# Patient Record
Sex: FEMALE | Race: WHITE | NOT HISPANIC OR LATINO | URBAN - METROPOLITAN AREA
[De-identification: names, ages, dates, MRNs, and addresses within clinical notes are randomized per-mention and may not be internally consistent; named-entity substitution may affect disease eponyms.]

---

## 2019-08-19 ENCOUNTER — APPOINTMENT (RX ONLY)
Dept: URBAN - METROPOLITAN AREA CLINIC 23 | Facility: CLINIC | Age: 59
Setting detail: DERMATOLOGY
End: 2019-08-19

## 2019-08-19 DIAGNOSIS — L72.0 EPIDERMAL CYST: ICD-10-CM

## 2019-08-19 DIAGNOSIS — L57.8 OTHER SKIN CHANGES DUE TO CHRONIC EXPOSURE TO NONIONIZING RADIATION: ICD-10-CM

## 2019-08-19 DIAGNOSIS — L72.8 OTHER FOLLICULAR CYSTS OF THE SKIN AND SUBCUTANEOUS TISSUE: ICD-10-CM

## 2019-08-19 DIAGNOSIS — L40.59 OTHER PSORIATIC ARTHROPATHY: ICD-10-CM | Status: WELL CONTROLLED

## 2019-08-19 DIAGNOSIS — L82.1 OTHER SEBORRHEIC KERATOSIS: ICD-10-CM

## 2019-08-19 DIAGNOSIS — L81.4 OTHER MELANIN HYPERPIGMENTATION: ICD-10-CM

## 2019-08-19 PROBLEM — F32.9 MAJOR DEPRESSIVE DISORDER, SINGLE EPISODE, UNSPECIFIED: Status: ACTIVE | Noted: 2019-08-19

## 2019-08-19 PROBLEM — M12.9 ARTHROPATHY, UNSPECIFIED: Status: ACTIVE | Noted: 2019-08-19

## 2019-08-19 PROBLEM — I10 ESSENTIAL (PRIMARY) HYPERTENSION: Status: ACTIVE | Noted: 2019-08-19

## 2019-08-19 PROBLEM — F41.9 ANXIETY DISORDER, UNSPECIFIED: Status: ACTIVE | Noted: 2019-08-19

## 2019-08-19 PROBLEM — H91.90 UNSPECIFIED HEARING LOSS, UNSPECIFIED EAR: Status: ACTIVE | Noted: 2019-08-19

## 2019-08-19 PROCEDURE — 99203 OFFICE O/P NEW LOW 30 MIN: CPT

## 2019-08-19 PROCEDURE — ? COUNSELING

## 2019-08-19 PROCEDURE — ? OTHER

## 2019-08-19 PROCEDURE — ? RECOMMENDATIONS

## 2019-08-19 PROCEDURE — ? TREATMENT REGIMEN

## 2019-08-19 ASSESSMENT — LOCATION DETAILED DESCRIPTION DERM
LOCATION DETAILED: RIGHT ANTERIOR PROXIMAL THIGH
LOCATION DETAILED: LEFT AXILLARY VAULT
LOCATION DETAILED: UPPER STERNUM
LOCATION DETAILED: RIGHT PROXIMAL DORSAL FOREARM
LOCATION DETAILED: LEFT MID-UPPER BACK
LOCATION DETAILED: LEFT SUPERIOR MEDIAL UPPER BACK
LOCATION DETAILED: RIGHT CENTRAL BUCCAL CHEEK
LOCATION DETAILED: RIGHT POSTERIOR ANKLE
LOCATION DETAILED: LEFT ANTERIOR DISTAL UPPER ARM
LOCATION DETAILED: LEFT CHIN
LOCATION DETAILED: LEFT INFERIOR LATERAL BUCCAL CHEEK

## 2019-08-19 ASSESSMENT — LOCATION ZONE DERM
LOCATION ZONE: AXILLAE
LOCATION ZONE: LEG
LOCATION ZONE: TRUNK
LOCATION ZONE: ARM
LOCATION ZONE: FACE

## 2019-08-19 ASSESSMENT — LOCATION SIMPLE DESCRIPTION DERM
LOCATION SIMPLE: LEFT UPPER BACK
LOCATION SIMPLE: RIGHT FOREARM
LOCATION SIMPLE: RIGHT THIGH
LOCATION SIMPLE: LEFT CHEEK
LOCATION SIMPLE: CHIN
LOCATION SIMPLE: LEFT UPPER ARM
LOCATION SIMPLE: LEFT AXILLARY VAULT
LOCATION SIMPLE: RIGHT ANKLE
LOCATION SIMPLE: CHEST
LOCATION SIMPLE: RIGHT CHEEK

## 2019-08-19 NOTE — PROCEDURE: TREATMENT REGIMEN
Detail Level: Zone
Plan: Consider tretinoin cream
Samples Given: CeraVe foaming face cleanser and CeraVe AM lotion

## 2019-08-19 NOTE — PROCEDURE: RECOMMENDATIONS
Detail Level: Zone
Recommendation Preamble: The following recommendations were made during the visit:  consultation with kimberly Saab

## 2019-08-19 NOTE — PROCEDURE: OTHER
Other (Free Text): Rec [facility] Sx if pt desires removal
Note Text (......Xxx Chief Complaint.): This diagnosis correlates with the
Detail Level: Zone

## 2020-09-28 ENCOUNTER — APPOINTMENT (RX ONLY)
Dept: URBAN - METROPOLITAN AREA CLINIC 23 | Facility: CLINIC | Age: 60
Setting detail: DERMATOLOGY
End: 2020-09-28

## 2020-09-28 NOTE — HPI: FULL BODY SKIN EXAMINATION
What Type Of Note Output Would You Prefer (Optional)?: Standard Output
What Is The Reason For Today's Visit?: Full Body Skin Examination
What Is The Reason For Today's Visit? (Being Monitored For X): concerning skin lesions on an annual basis
How Severe Are Your Spot(S)?: mild
Additional History: Pt gives verbal consent to biopsy.Shellie

## 2020-11-23 ENCOUNTER — HOSPITAL ENCOUNTER (OUTPATIENT)
Dept: LAB | Age: 60
Discharge: HOME | End: 2020-11-23

## 2020-11-23 PROCEDURE — 88305 TISSUE EXAM BY PATHOLOGIST: CPT

## 2021-04-01 PROBLEM — M54.2 CERVICALGIA: Status: ACTIVE | Noted: 2019-01-07

## 2021-04-01 PROBLEM — M47.812 CERVICAL SPONDYLOSIS WITHOUT MYELOPATHY: Status: ACTIVE | Noted: 2019-01-07

## 2021-04-01 PROBLEM — G89.29 CHRONIC BILATERAL LOW BACK PAIN WITHOUT SCIATICA: Status: ACTIVE | Noted: 2019-05-28

## 2021-04-01 PROBLEM — L40.50 PSORIATIC ARTHROPATHY (HCC): Status: ACTIVE | Noted: 2021-04-01

## 2021-04-01 PROBLEM — M54.50 CHRONIC BILATERAL LOW BACK PAIN WITHOUT SCIATICA: Status: ACTIVE | Noted: 2019-05-28

## 2021-04-01 PROBLEM — Z72.821 POOR SLEEP HYGIENE: Status: ACTIVE | Noted: 2021-04-01

## 2021-04-01 PROBLEM — R06.83 SNORING: Status: ACTIVE | Noted: 2021-04-01

## 2021-04-09 PROBLEM — J41.0 SIMPLE CHRONIC BRONCHITIS (HCC): Chronic | Status: ACTIVE | Noted: 2021-04-09

## 2021-04-09 PROBLEM — D84.821 IMMUNOSUPPRESSION DUE TO DRUG THERAPY (HCC): Status: ACTIVE | Noted: 2021-04-09

## 2021-04-09 PROBLEM — F17.200 NICOTINE DEPENDENCE: Status: ACTIVE | Noted: 2021-04-09

## 2021-04-09 PROBLEM — R09.89 HYPERINFLATION OF LUNGS: Chronic | Status: ACTIVE | Noted: 2021-04-09

## 2021-04-09 PROBLEM — Z79.899 IMMUNOSUPPRESSION DUE TO DRUG THERAPY (HCC): Status: ACTIVE | Noted: 2021-04-09

## 2021-04-19 ENCOUNTER — HOSPITAL ENCOUNTER (OUTPATIENT)
Dept: SLEEP MEDICINE | Age: 61
Discharge: HOME | End: 2021-04-19
Payer: COMMERCIAL

## 2021-04-19 PROCEDURE — 95806 SLEEP STUDY UNATT&RESP EFFT: CPT

## 2021-04-22 ENCOUNTER — HOSPITAL ENCOUNTER (OUTPATIENT)
Dept: CT IMAGING | Age: 61
Discharge: HOME | End: 2021-04-22
Attending: NURSE PRACTITIONER
Payer: COMMERCIAL

## 2021-04-22 VITALS — BODY MASS INDEX: 18.48 KG/M2 | WEIGHT: 115 LBS | HEIGHT: 66 IN

## 2021-04-22 DIAGNOSIS — F17.218 CIGARETTE NICOTINE DEPENDENCE WITH OTHER NICOTINE-INDUCED DISORDER: Chronic | ICD-10-CM

## 2021-04-22 PROCEDURE — 71271 CT THORAX LUNG CANCER SCR C-: CPT

## 2021-04-23 NOTE — PROGRESS NOTES
CT is personally reviewed, demonstrates 3 mm RUL nodule and emphysematous changes. I have contacted her with findings. She will have complete pulmonary function test as scheduled and keep follow-up appointment as planned. We will obtain alpha-1 antitrypsin level at her next visit and have discussed having annual screening CT in 1 year.

## 2021-06-18 PROBLEM — J41.0 SIMPLE CHRONIC BRONCHITIS (HCC): Chronic | Status: RESOLVED | Noted: 2021-04-09 | Resolved: 2021-06-18

## 2021-06-18 PROBLEM — J43.2 CENTRILOBULAR EMPHYSEMA (HCC): Status: ACTIVE | Noted: 2021-06-18

## 2021-07-07 PROBLEM — J44.9 OSA AND COPD OVERLAP SYNDROME (HCC): Status: ACTIVE | Noted: 2021-04-01

## 2021-07-07 PROBLEM — G47.33 OSA AND COPD OVERLAP SYNDROME (HCC): Status: ACTIVE | Noted: 2021-04-01

## 2021-07-07 PROBLEM — G47.10 HYPERSOMNIA: Status: ACTIVE | Noted: 2021-07-07

## 2021-10-26 ENCOUNTER — HOSPITAL ENCOUNTER (OUTPATIENT)
Dept: INTERVENTIONAL RADIOLOGY/VASCULAR | Age: 61
Discharge: HOME | End: 2021-10-26
Attending: PSYCHIATRY & NEUROLOGY

## 2021-10-27 PROBLEM — Z87.891 HISTORY OF CIGARETTE SMOKING: Status: ACTIVE | Noted: 2021-10-27

## 2021-10-27 PROBLEM — F17.200 NICOTINE DEPENDENCE: Status: RESOLVED | Noted: 2021-04-09 | Resolved: 2021-10-27

## 2021-10-27 PROBLEM — J44.9 COPD (CHRONIC OBSTRUCTIVE PULMONARY DISEASE) (HCC): Status: ACTIVE | Noted: 2021-06-18

## 2021-12-03 ENCOUNTER — HOSPITAL ENCOUNTER (OUTPATIENT)
Dept: INTERVENTIONAL RADIOLOGY/VASCULAR | Age: 61
Discharge: HOME | End: 2021-12-03
Attending: PSYCHIATRY & NEUROLOGY
Payer: COMMERCIAL

## 2021-12-03 ENCOUNTER — HOSPITAL ENCOUNTER (OUTPATIENT)
Dept: CT IMAGING | Age: 61
Discharge: HOME | End: 2021-12-03
Attending: PSYCHIATRY & NEUROLOGY
Payer: COMMERCIAL

## 2021-12-03 VITALS
HEART RATE: 59 BPM | SYSTOLIC BLOOD PRESSURE: 113 MMHG | WEIGHT: 105.5 LBS | BODY MASS INDEX: 17.58 KG/M2 | OXYGEN SATURATION: 92 % | HEIGHT: 65 IN | RESPIRATION RATE: 16 BRPM | DIASTOLIC BLOOD PRESSURE: 57 MMHG | TEMPERATURE: 97.7 F

## 2021-12-03 DIAGNOSIS — Z82.49 FAMILY HISTORY OF CEREBRAL ANEURYSM: ICD-10-CM

## 2021-12-03 DIAGNOSIS — R90.89 ABNORMAL FINDING ON MRI OF BRAIN: ICD-10-CM

## 2021-12-03 DIAGNOSIS — G43.009 MIGRAINE WITHOUT AURA AND WITHOUT STATUS MIGRAINOSUS, NOT INTRACTABLE: ICD-10-CM

## 2021-12-03 DIAGNOSIS — H53.8 BLURRY VISION, RIGHT EYE: ICD-10-CM

## 2021-12-03 LAB
APPEARANCE FLD: CLEAR
COLOR FLD: COLORLESS
CREAT BLD-MCNC: 0.64 MG/DL (ref 0.8–1.5)
GLUCOSE CSF-MCNC: 58 MG/DL (ref 40–70)
NUC CELL # FLD: 2 /CU MM
PROT CSF-MCNC: 59 MG/DL (ref 15–45)
RBC # FLD: 0 /CU MM
SPECIMEN SOURCE FLD: NORMAL
TUBE # CSF: 1
TUBE # CSF: 1

## 2021-12-03 PROCEDURE — 74011000250 HC RX REV CODE- 250: Performed by: PHYSICIAN ASSISTANT

## 2021-12-03 PROCEDURE — 82565 ASSAY OF CREATININE: CPT

## 2021-12-03 PROCEDURE — 74011000258 HC RX REV CODE- 258: Performed by: PSYCHIATRY & NEUROLOGY

## 2021-12-03 PROCEDURE — 74011000636 HC RX REV CODE- 636: Performed by: PSYCHIATRY & NEUROLOGY

## 2021-12-03 PROCEDURE — 82040 ASSAY OF SERUM ALBUMIN: CPT

## 2021-12-03 PROCEDURE — 77030003666 HC NDL SPINAL BD -A

## 2021-12-03 PROCEDURE — 82164 ANGIOTENSIN I ENZYME TEST: CPT

## 2021-12-03 PROCEDURE — 70498 CT ANGIOGRAPHY NECK: CPT

## 2021-12-03 PROCEDURE — 73060999999 HC MISC LAB CHARGE

## 2021-12-03 PROCEDURE — 86592 SYPHILIS TEST NON-TREP QUAL: CPT

## 2021-12-03 PROCEDURE — 86617 LYME DISEASE ANTIBODY: CPT

## 2021-12-03 PROCEDURE — 82945 GLUCOSE OTHER FLUID: CPT

## 2021-12-03 PROCEDURE — 77030014143 HC TY PUNC LUMBR BD -A

## 2021-12-03 PROCEDURE — 84157 ASSAY OF PROTEIN OTHER: CPT

## 2021-12-03 PROCEDURE — 89050 BODY FLUID CELL COUNT: CPT

## 2021-12-03 PROCEDURE — 62270 DX LMBR SPI PNXR: CPT

## 2021-12-03 PROCEDURE — 83520 IMMUNOASSAY QUANT NOS NONAB: CPT

## 2021-12-03 PROCEDURE — 87205 SMEAR GRAM STAIN: CPT

## 2021-12-03 RX ORDER — SODIUM CHLORIDE 0.9 % (FLUSH) 0.9 %
10 SYRINGE (ML) INJECTION
Status: COMPLETED | OUTPATIENT
Start: 2021-12-03 | End: 2021-12-03

## 2021-12-03 RX ORDER — LIDOCAINE HYDROCHLORIDE 20 MG/ML
1-10 INJECTION, SOLUTION INFILTRATION; PERINEURAL
Status: DISCONTINUED | OUTPATIENT
Start: 2021-12-03 | End: 2021-12-07 | Stop reason: HOSPADM

## 2021-12-03 RX ADMIN — Medication 10 ML: at 08:44

## 2021-12-03 RX ADMIN — LIDOCAINE HYDROCHLORIDE 100 MG: 20 INJECTION, SOLUTION INFILTRATION; PERINEURAL at 09:30

## 2021-12-03 RX ADMIN — IOPAMIDOL 50 ML: 755 INJECTION, SOLUTION INTRAVENOUS at 08:44

## 2021-12-03 RX ADMIN — SODIUM CHLORIDE 100 ML: 900 INJECTION, SOLUTION INTRAVENOUS at 08:44

## 2021-12-03 NOTE — DISCHARGE INSTRUCTIONS
[address]  Department of Interventional Radiology  [facility]  [phone] Office  [phone] Fax  POST LUMBAR PUNCTURE/MYELOGRAM/INTRATHECAL CHEMOTHERAPY DISCHARGE INSTRUCTIONS  General Information:  Lumbar Puncture: A LP is done to help diagnose several disorders, like pseudo tumor, migraines, meningitis, and multiple sclerosis. It involves a puncture (usually in the lower spine) into the sac that protects the spinal column. A sample of the fluid in that space is removed and tested in the lab. Myelogram:   A Myelogram involves a lumbar puncture, and instead of removing fluid, contrast will be injected into the sac surrounding the spinal column. It is done to visualize the spinal column, nerve roots, spinal canal, vertebral discs and disc space. It is usually done to diagnose back pain with unknown cause or in preparation for surgery. After the injection, a CT scan will be done, usually within two hours of the injection. Intrathecal Chemotherapy:   Chemotherapy can be given in many forms. Intrathecal chemo involves a lumbar puncture, and instead of removing fluid, the chemo will be injected into the space. After any of these procedures, you will be asked to lie flat on your back for 4-6 hours to prevent complications. You should also rest for 24 hours after you go home, and force fluids. If you have a headache, you should take Tylenol or acetaminophen. Call If:   You should call your Physician and/or the Radiology Nurse if you develop a headache that is not relieved by Tylenol, and worsens when you stand and eases when you lie down, you need to call. You may have developed what is referred to as a spinal headache. Our physicians will probably advise you to be on strict bed rest for 24 hours, to drink lots of fluids and caffeine. If this does not help the head pain, call again the next day.  You should call if you have bleeding other than a small spot on your bandage. You should call if you have any numbness, tingling, weakness, fever, chills, urinary retention, severe itching, rash, welts, swelling, or confusion. Follow-up Instructions: See the doctor who ordered your procedure as he/she has instructed. If you had a Lumbar Puncture or Myelogram, your results should be available to your ordering doctor in 3-5 business days. You can remove your dressing in 24 hours and shower regularly. Do not bathe or swim for 72 hours. To Reach Us: If you have any questions about your procedure, please call the Interventional Radiology department at [phone]. After business hours (5pm) and weekends, call the answering service at [phone] and ask for the Radiologist on call to be paged. Si tiene Preguntas acerca del procedimiento, por favor llame al departamento de Radiología Intervencional al [phone]. Después de horas de oficina (5 pm) y los fines de [place], llamar al [facility] [place] al [phone] y pregunte por el Radiologo de [facility]. Interventional Radiology General Nurse Discharge    After general anesthesia or intravenous sedation, for 24 hours or while taking prescription Narcotics:  · Limit your activities  · Do not drive and operate hazardous machinery  · Do not make important personal or business decisions  · Do  not drink alcoholic beverages  · If you have not urinated within 8 hours after discharge, please contact your surgeon on call. * Please give a list of your current medications to your Primary Care Provider. * Please update this list whenever your medications are discontinued, doses are     changed, or new medications (including over-the-counter products) are added. * Please carry medication information at all times in case of emergency situations.     These are general instructions for a healthy lifestyle:    No smoking/ No tobacco products/ Avoid exposure to second hand smoke  Surgeon Sara Lamas Warning:  Quitting smoking now greatly reduces serious risk to your health. Obesity, smoking, and sedentary lifestyle greatly increases your risk for illness  A healthy diet, regular physical exercise & weight monitoring are important for maintaining a healthy lifestyle    You may be retaining fluid if you have a history of heart failure or if you experience any of the following symptoms:  Weight gain of 3 pounds or more overnight or 5 pounds in a week, increased swelling in our hands or feet or shortness of breath while lying flat in bed. Please call your doctor as soon as you notice any of these symptoms; do not wait until your next office visit. Recognize signs and symptoms of STROKE:  F-face looks uneven    A-arms unable to move or move unevenly    S-speech slurred or non-existent    T-time-call 911 as soon as signs and symptoms begin-DO NOT go       Back to bed or wait to see if you get better-TIME IS BRAIN.     Patient Signature:  Date: 12/3/2021  Discharging Nurse: Alena Barrios RN

## 2021-12-03 NOTE — PROGRESS NOTES
TRANSFER - OUT REPORT:    Verbal report given to YODIT Harvey(name) on Maria Teresa Paul  being transferred to [facility](unit) for routine post - op       Report consisted of patients Situation, Background, Assessment and   Recommendations(SBAR). Information from the following report(s) SBAR and Procedure Summary was reviewed with the receiving nurse. Opportunity for questions and clarification was provided. Pt tolerated procedure well.      Visit Vitals  BP (!) 118/58   Pulse (!) 55   Temp 97.7 °F (36.5 °C)   Resp 18   Ht 5' 5\" (1.651 m)   Wt 47.9 kg (105 lb 8 oz)   SpO2 96%   Breastfeeding No   BMI 17.56 kg/m²     Past Medical History:   Diagnosis Date    Ankylosing spondylitis (HCC)     Bronchitis     Spinal stenosis      Peripheral IV 12/03/21 Distal; Right Antecubital (Active)

## 2021-12-06 LAB
ANGIO CONVERTING ENZ, CSF: 2.8 U/L (ref 0–3.1)
REAGIN AB CSF QL: NON REACTIVE

## 2021-12-07 LAB
ALB CSF/SERPL: 8 {RATIO} (ref 0–8)
ALBUMIN CSF-MCNC: 30 MG/DL (ref 8–37)
ALBUMIN SERPL-MCNC: 3.9 G/DL (ref 3.8–4.9)
IGG CSF-MCNC: 4.3 MG/DL (ref 0–6.7)
IGG SERPL-MCNC: 1237 MG/DL (ref 586–1602)
IGG SYNTH RATE SER+CSF CALC-MRATE: NORMAL MG/DAY
IGG/ALB CLEAR SER+CSF-RTO: 0.5 (ref 0–0.7)
IGG/ALB CSF: 0.14 {RATIO} (ref 0–0.25)
OLIGOCLONAL BANDS.IT SER+CSF QL: NORMAL

## 2021-12-08 LAB
BACTERIA SPEC CULT: NORMAL
GRAM STN SPEC: NORMAL
GRAM STN SPEC: NORMAL
SERVICE CMNT-IMP: NORMAL

## 2021-12-08 NOTE — PROGRESS NOTES
Have you had echocardiogram done in the past?  I cannot find this in your file. If not yet done in the past, I will arrange to complete stroke work-up.

## 2021-12-14 LAB
B BURGDOR IGG PATRN CSF IB-IMP: NEGATIVE
B BURGDOR IGM PATRN CSF IB-IMP: NEGATIVE
B BURGDOR18KD IGG CSF QL IB: NORMAL
B BURGDOR23KD IGG CSF QL IB: NORMAL
B BURGDOR23KD IGM CSF QL IB: NORMAL
B BURGDOR28KD IGG CSF QL IB: NORMAL
B BURGDOR30KD IGG CSF QL IB: NORMAL
B BURGDOR39KD IGG CSF QL IB: NORMAL
B BURGDOR39KD IGM CSF QL IB: NORMAL
B BURGDOR41KD IGG CSF QL IB: NORMAL
B BURGDOR41KD IGM CSF QL IB: NORMAL
B BURGDOR45KD IGG CSF QL IB: NORMAL
B BURGDOR58KD IGG CSF QL IB: NORMAL
B BURGDOR66KD IGG CSF QL IB: NORMAL
B BURGDOR93KD IGG CSF QL IB: NORMAL

## 2021-12-30 LAB
Lab: NORMAL
REFERENCE LAB,REFLB: NORMAL
TEST DESCRIPTION:,ATST: NORMAL

## 2022-01-25 ENCOUNTER — HOSPITAL ENCOUNTER (OUTPATIENT)
Dept: GENERAL RADIOLOGY | Age: 62
Discharge: HOME | End: 2022-01-25
Payer: COMMERCIAL

## 2022-01-25 DIAGNOSIS — J44.1 CHRONIC OBSTRUCTIVE PULMONARY DISEASE WITH ACUTE EXACERBATION (HCC): ICD-10-CM

## 2022-01-25 DIAGNOSIS — Z87.891 HISTORY OF CIGARETTE SMOKING: ICD-10-CM

## 2022-01-25 DIAGNOSIS — R09.89 HYPERINFLATION OF LUNGS: ICD-10-CM

## 2022-01-25 PROCEDURE — 71046 X-RAY EXAM CHEST 2 VIEWS: CPT

## 2022-03-18 PROBLEM — J44.9 COPD (CHRONIC OBSTRUCTIVE PULMONARY DISEASE) (HCC): Status: ACTIVE | Noted: 2021-06-18

## 2022-03-19 PROBLEM — Z72.821 POOR SLEEP HYGIENE: Status: ACTIVE | Noted: 2021-04-01

## 2022-03-19 PROBLEM — M47.812 CERVICAL SPONDYLOSIS WITHOUT MYELOPATHY: Status: ACTIVE | Noted: 2019-01-07

## 2022-03-19 PROBLEM — M54.2 CERVICALGIA: Status: ACTIVE | Noted: 2019-01-07

## 2022-03-19 PROBLEM — D84.821 IMMUNOSUPPRESSION DUE TO DRUG THERAPY (HCC): Status: ACTIVE | Noted: 2021-04-09

## 2022-03-19 PROBLEM — R09.89 HYPERINFLATION OF LUNGS: Status: ACTIVE | Noted: 2021-04-09

## 2022-03-19 PROBLEM — Z79.899 IMMUNOSUPPRESSION DUE TO DRUG THERAPY (HCC): Status: ACTIVE | Noted: 2021-04-09

## 2022-03-19 PROBLEM — Z87.891 HISTORY OF CIGARETTE SMOKING: Status: ACTIVE | Noted: 2021-10-27

## 2022-03-20 PROBLEM — J44.9 OSA AND COPD OVERLAP SYNDROME (HCC): Status: ACTIVE | Noted: 2021-04-01

## 2022-03-20 PROBLEM — G89.29 CHRONIC BILATERAL LOW BACK PAIN WITHOUT SCIATICA: Status: ACTIVE | Noted: 2019-05-28

## 2022-03-20 PROBLEM — L40.50 PSORIATIC ARTHROPATHY (HCC): Status: ACTIVE | Noted: 2021-04-01

## 2022-03-20 PROBLEM — G47.33 OSA AND COPD OVERLAP SYNDROME (HCC): Status: ACTIVE | Noted: 2021-04-01

## 2022-03-20 PROBLEM — M54.50 CHRONIC BILATERAL LOW BACK PAIN WITHOUT SCIATICA: Status: ACTIVE | Noted: 2019-05-28

## 2022-03-20 PROBLEM — G47.10 HYPERSOMNIA: Status: ACTIVE | Noted: 2021-07-07

## 2022-06-29 DIAGNOSIS — Z87.891 HISTORY OF NICOTINE DEPENDENCE: ICD-10-CM

## 2022-06-29 DIAGNOSIS — Z12.9 SCREENING FOR CANCER: Primary | ICD-10-CM

## 2022-07-11 ENCOUNTER — HOSPITAL ENCOUNTER (OUTPATIENT)
Dept: CT IMAGING | Age: 62
Discharge: HOME | End: 2022-07-14
Payer: COMMERCIAL

## 2022-07-11 DIAGNOSIS — Z87.891 HISTORY OF NICOTINE DEPENDENCE: ICD-10-CM

## 2022-07-11 DIAGNOSIS — Z12.9 SCREENING FOR CANCER: ICD-10-CM

## 2022-07-11 PROCEDURE — 71271 CT THORAX LUNG CANCER SCR C-: CPT

## 2022-07-14 ENCOUNTER — OFFICE VISIT (OUTPATIENT)
Dept: PULMONOLOGY | Age: 62
End: 2022-07-14
Payer: COMMERCIAL

## 2022-07-14 VITALS
DIASTOLIC BLOOD PRESSURE: 80 MMHG | SYSTOLIC BLOOD PRESSURE: 110 MMHG | TEMPERATURE: 98 F | WEIGHT: 111 LBS | HEART RATE: 97 BPM | BODY MASS INDEX: 18.49 KG/M2 | RESPIRATION RATE: 20 BRPM | OXYGEN SATURATION: 97 % | HEIGHT: 65 IN

## 2022-07-14 DIAGNOSIS — Z87.891 PERSONAL HISTORY OF TOBACCO USE, PRESENTING HAZARDS TO HEALTH: ICD-10-CM

## 2022-07-14 DIAGNOSIS — J44.9 CHRONIC OBSTRUCTIVE PULMONARY DISEASE, UNSPECIFIED COPD TYPE (HCC): Primary | ICD-10-CM

## 2022-07-14 DIAGNOSIS — R91.1 LUNG NODULE: ICD-10-CM

## 2022-07-14 PROCEDURE — 99213 OFFICE O/P EST LOW 20 MIN: CPT | Performed by: INTERNAL MEDICINE

## 2022-07-14 RX ORDER — FLUTICASONE FUROATE AND VILANTEROL 100; 25 UG/1; UG/1
1 POWDER RESPIRATORY (INHALATION) DAILY
Qty: 1 EACH | Refills: 11 | Status: SHIPPED | OUTPATIENT
Start: 2022-07-14

## 2022-07-14 NOTE — PROGRESS NOTES
[address] [place]. [address], [address] [facility]  [phone]    Patient Name:  Evelin Strange      YOB: 1960  Office Visit 7/14/2022    ASSESSMENT AND PLAN:  (Medical Decision Making)    Pt with history of tobacco use resulting in COPD with mild obstruction on spirometry but significant centrilobular emphysema seen on CT scan.     -cont breo. Refilled today.   -encouraged smoking cessation. She has tried chantix, wellbutrin, nicotine patches. Will try gum next. -next lung ca screening CT July 2023. Follow-up in 6 months    Diagnoses and all orders for this visit:  Chronic obstructive pulmonary disease, unspecified COPD type ([address]  Personal history of tobacco use, presenting hazards to health  Lung nodule  Other orders  -     fluticasone-vilanterol (BREO ELLIPTA) 100-25 MCG/INH AEPB inhaler; Inhale 1 puff into the lungs daily    Micheal Whitley MD    Clinical time for encounter was 20 minutes. _________________________________________________________________________    HISTORY OF PRESENT ILLNESS:    Ms. Pedro Voss in our clinic today who presents with a history of COPD  . Ongoing tobacco use. A1AT testing was normal.     She is here today for follow up appointment. She is currently on Breo. Still struggles with forgetting to use it. Struggles with this more than her other oral medications. She admits to not taking this recently and notices a bit more shortness of breath. She states she has increased back to 1ppd smoking. Previously was only smoking a few a day. She had been using chantix to help her quit smoking. Within 3 days of stopping chantix she started buying a pack a day again. She states she has tried nicotine patches. She had a repeat lung cancer screening CT in July 2022 which was stable. REVIEW OF SYSTEMS:  10 point review of systems is negative except as reported in HPI.     PHYSICAL EXAM:  Vitals:    07/14/22 1511   BP: 110/80   Pulse: 97   Resp: 20   Temp: 98 °F (36.7 °C)   SpO2: 97%       PERTINENT FINDINGS:   No acute distress  Lungs are clear to auscultation bilaterally. No wheezes rales or rhonchi. Heart demonstrates a regular rate and rhythm with no murmurs rubs or gallops. There is no lower extremity edema. DIAGNOSTIC TESTS:                                                                                                             LABS: No results for input(s): HGB, HCT, TSH, NTPROBNP in the last 72 hours. Imaging:  CXR: XR CHEST (2 VW) 01/25/2022    Narrative  Chest 2 view    CLINICAL INDICATION: Acute on chronic moderate shortness of breath with  exacerbation, COPD, restrictive emphysema and hyperinflation, cigarette smoking,  right upper lobe nodule    COMPARISON: CT 4/22/2021    TECHNIQUE: Upright PA  and lateral views of the chest    FINDINGS: Lung volumes are hyperinflated, with upper lobe predominant emphysema  again evident. Cardiomediastinal silhouette and hilar contours within normal  limits. The lungs demonstrate no consolidation, pneumothorax, pleural  effusion, CHF, active tuberculosis, or focal infiltrate. The patient's known  right upper lobe tiny nodule is better evaluated on prior CT, not definitely  seen by radiograph given the size, but there are no suspicious developing  abnormalities at this level. No acute osseous abnormalities are seen. Impression  No acute disease. Emphysema. CT WITHOUT CONTRAST: No results found for this or any previous visit from the past 365 days. CT WITH CONTRAST: No results found for this or any previous visit from the past 365 days. CT HIGH RES: No results found for this or any previous visit from the past 365 days. CT PE PROTOCOL: No results found for this or any previous visit from the past 365 days.     LDCT SCREENING: CT LUNG SCREENING 07/11/2022    Narrative  CT lung screening 7/11/2022    INDICATION:  40+ pack year history of tobacco use. Current smoker. Multiple axial images were obtained through the chest without IV contrast.  Low  dose CT protocol was used. Radiation dose reduction techniques were used for  this study: All CT scans performed at this facility use one or all of the  following: Automated exposure control, adjustment of the mA and/or kVp according  to patient's size, iterative reconstruction. COMPARISON: Low-dose screening lung CT 4/22/2021    FINDINGS:  No new suspicious memory mass or significant nodule by follow-up screening lung  CT criteria is seen. The prior 3 mm right upper lobe nodule is not clearly  demonstrated and should occur on approximately axial detailed image 84. The  central airways are patent. Stable moderate to advanced panacinar appearing  emphysematous changes are seen of the lungs. There is no significant mediastinal or axillary adenopathy. There are no bony  lesions. Bilateral breast implants are present. Limited imaging of the upper  abdomen is unremarkable. Impression  1. No new suspicious pulmonary lesion. The prior 3 mm right upper lobe nodule  appears improved. Continued annual low-dose screening lung CT is recommended  until the patient is no longer a candidate for definitive treatment. Please note that a negative screening lung CT does not mean the patient does  not have lung cancer    L1 Negative: No nodules or nodules with specific calcifications such as  complete, central, popcorn, concentric rings, and fat containing nodules. Continue annual screening with noncontrast chest CT using LDCT protocol in 12  months. PET SCAN: No results found for this or any previous visit from the past 365 days. PFTs:   No flowsheet data found.       Spirometry:            Date:     5/27/21               FVC     3.27-96               FEV1     2.18-81               FEV1/FVC     67               FEF 25-75%     1.22-47               Bronchodilator Response    negative                TLC 6.               RV     2.               DLCO     12.2-46                     Exercise Oximetry:   Echo: 03/21/22    TRANSTHORACIC ECHOCARDIOGRAM (TTE) COMPLETE (CONTRAST/BUBBLE/3D PRN) 03/21/2022  7:28 PM (Final)    Narrative  This is a summary report. The complete report is available in the patient's medical record. If you cannot access the medical record, please contact the sending organization for a detailed fax or copy.   Left Ventricle: Left ventricle size is normal. Normal wall thickness. Normal left ventricular systolic function with a visually estimated EF of 65 - 70%. Normal diastolic function.     Signed by: Tati Schwarz MD on 3/21/2022  7:28 PM    [facility] Reference Info:                                                                                                                  Past Medical History:   Diagnosis Date    [address]ylosing sp[address] [address] [facility]     Bronchitis     Spinal stenosis          Tobacco Use: High Risk    Smoking Tobacco Use: Current Every Day Smoker    Smokeless Tobacco Use: Former User     Allergies   Allergen Reactions    Penicillins Rash     Current Outpatient Medications   Medication Instructions    diphenoxylate-atropine (LOMOTIL) 2.5-0.025 MG per tablet 2    Erenumab-aooe (AIMOVIG) 140 MG/ML SOAJ INJECT 140 MG SUBCUTANEOUSLY EVERY 30 DAYS (MIGRAINE)    fluticasone-vilanterol (BREO ELLIPTA) 100-25 MCG/INH AEPB inhaler 1 puff, Inhalation, DAILY    HYDROcodone-acetaminophen (NORCO)  MG per tablet 1 tablet as needed    LORazepam (ATIVAN) 0.5 MG tablet 1 tablet as needed    Metoprolol Succinate 25 MG CS24 1 tablet    SUMAtriptan (IMITREX) 100 MG tablet 1 tablet as needed    topiramate (TOPAMAX SPRINKLE) 25 MG capsule 2    VORTIoxetine (TRINTELLIX) 10 mg, Oral, DAILY

## 2022-12-22 RX ORDER — ALBUTEROL SULFATE 90 UG/1
2 AEROSOL, METERED RESPIRATORY (INHALATION) EVERY 6 HOURS PRN
Qty: 1 EACH | Refills: 5 | Status: SHIPPED | OUTPATIENT
Start: 2022-12-22

## 2023-01-03 ENCOUNTER — TELEPHONE (OUTPATIENT)
Dept: PULMONOLOGY | Age: 63
End: 2023-01-03

## 2023-01-03 NOTE — TELEPHONE ENCOUNTER
TRIAGE CALL      Complaint: Coughing /Congestion  Cough: yes  Productive:  yes  Bloody Sputum:  no  Increased SOB/Wheezing:  yes  Duration: 1 weeks  Fever/Chills: no  OTC Meds tried: nasal spray

## 2023-01-05 RX ORDER — PREDNISONE 20 MG/1
40 TABLET ORAL DAILY
Qty: 10 TABLET | Refills: 0 | Status: SHIPPED | OUTPATIENT
Start: 2023-01-05 | End: 2023-01-10

## 2023-01-05 NOTE — TELEPHONE ENCOUNTER
Direct conversation with Nellie Bahena NP who states that pt can have 5 days of 40mg prednisone burst. States that pt should get COVID/flu tested as well. Reviewed with pt who verbalizes understanding and very grateful.

## 2023-02-15 ENCOUNTER — OFFICE VISIT (OUTPATIENT)
Dept: PULMONOLOGY | Age: 63
End: 2023-02-15
Payer: COMMERCIAL

## 2023-02-15 VITALS
RESPIRATION RATE: 20 BRPM | BODY MASS INDEX: 18.66 KG/M2 | WEIGHT: 112 LBS | TEMPERATURE: 98 F | OXYGEN SATURATION: 94 % | HEIGHT: 65 IN | HEART RATE: 66 BPM | SYSTOLIC BLOOD PRESSURE: 156 MMHG | DIASTOLIC BLOOD PRESSURE: 73 MMHG

## 2023-02-15 DIAGNOSIS — Z87.891 PERSONAL HISTORY OF TOBACCO USE, PRESENTING HAZARDS TO HEALTH: Primary | ICD-10-CM

## 2023-02-15 DIAGNOSIS — Z12.9 CANCER SCREENING: ICD-10-CM

## 2023-02-15 DIAGNOSIS — J44.9 CHRONIC OBSTRUCTIVE PULMONARY DISEASE, UNSPECIFIED COPD TYPE (HCC): ICD-10-CM

## 2023-02-15 PROCEDURE — 99214 OFFICE O/P EST MOD 30 MIN: CPT | Performed by: INTERNAL MEDICINE

## 2023-02-15 RX ORDER — VARENICLINE TARTRATE 0.5 MG/1
TABLET, FILM COATED ORAL
Qty: 1 BOX | Refills: 0 | Status: SHIPPED | OUTPATIENT
Start: 2023-02-15

## 2023-02-15 RX ORDER — VARENICLINE TARTRATE 1 MG/1
1 TABLET, FILM COATED ORAL 2 TIMES DAILY
Qty: 60 TABLET | Refills: 5 | Status: SHIPPED | OUTPATIENT
Start: 2023-02-15 | End: 2023-05-16

## 2023-02-15 RX ORDER — VARENICLINE TARTRATE 0.5 MG/1
TABLET, FILM COATED ORAL
Qty: 1 BOX | Refills: 0 | Status: SHIPPED | OUTPATIENT
Start: 2023-02-15 | End: 2023-02-15

## 2023-02-15 NOTE — PROGRESS NOTES
[address]. [address], [address] [facility]  [phone]    Patient Name:  Jesi Sánchez      YOB: 1960  Office Visit 2/15/2023    ASSESSMENT AND PLAN:  (Medical Decision Making)    Pt with history of ongoing tobacco use resulting in COPD with mild obstruction on spirometry but significant centrilobular emphysema seen on CT scan and severe reduction in DLCO. Reports more BUITRAGO. -walking sat check in office today: lowest sat 92%. Will cont to monitor and will refer to pulmonary rehab. She is not sure this will fit with her schedule but she would like to explore this further.   -cont breo, prn albuterol.   -rx for chantix sent to her pharmacy to help with smoking cessation. -next lung cancer screening CT July 2023. Ordered today. F/u in 6 months. Diagnoses and all orders for this visit:  Personal history of tobacco use, presenting hazards to health  -     varenicline (CHANTIX) 1 MG tablet; Take 1 tablet by mouth 2 times daily  -     varenicline (CHANTIX) 0.5 MG tablet; Days 1 to 3: 0.5mg once a day Days 4 to 7: 0.5mg twice a day Day 8 and after: 1mg twice a day. -     CT CHEST WO CONTRAST; Future  Cancer screening  -     CT CHEST WO CONTRAST; Future  Chronic obstructive pulmonary disease, unspecified COPD type (HCC)    Dianna Lopez MD    Clinical time for encounter was 20 minutes. _________________________________________________________________________    HISTORY OF PRESENT ILLNESS:    Ms. Bhavin Chaudhry in our clinic today who presents with a history of COPD  CLAUDIA on CPAP, ongoing tobacco use. A1AT testing was normal. H/o psoriatic arthritis, ankylosing spondylitis. PFT's June 2021 with ratio 67%, FEV1 81, DLCO 46%. She is here today for follow up appointment. At her last appt she was on Breo and smoking cessation was encouraged. She states she has ongoing fatigue. This seems to be getting worse. She cleans houses.  When she climbs the stairs she feels like she is huffing and puffing. She tells me that she had URI in December. Treated with PCP with abx but was not covid or flu tested at that time. Received 2 courses of steroids. Has some ongoing congestion and cough. She has some wheeze when she lays down at night. Reports associated nasal congestion at night. Intermittently taking claritin. CLAUDIA on CPAP every night. She also continues to smoke 1 ppd. She is interested in trying chantix. REVIEW OF SYSTEMS:  10 point review of systems is negative except as reported in HPI. PHYSICAL EXAM:  Vitals:    02/15/23 1443   BP: (!) 156/73   Pulse: 66   Resp: 20   Temp: 98 °F (36.7 °C)   SpO2: 94%       PERTINENT FINDINGS:   No acute distress  Lungs are clear to auscultation bilaterally. No wheezes rales or rhonchi. Heart demonstrates a regular rate and rhythm with no murmurs rubs or gallops. There is no lower extremity edema. DIAGNOSTIC TESTS:                                                                                                             LABS: No results for input(s): HGB, HCT, TSH, NTPROBNP in the last 72 hours. Imaging:  CXR: XR CHEST (2 VW) 01/25/2022    Narrative  Chest 2 view    CLINICAL INDICATION: Acute on chronic moderate shortness of breath with  exacerbation, COPD, restrictive emphysema and hyperinflation, cigarette smoking,  right upper lobe nodule    COMPARISON: CT 4/22/2021    TECHNIQUE: Upright PA  and lateral views of the chest    FINDINGS: Lung volumes are hyperinflated, with upper lobe predominant emphysema  again evident. Cardiomediastinal silhouette and hilar contours within normal  limits. The lungs demonstrate no consolidation, pneumothorax, pleural  effusion, CHF, active tuberculosis, or focal infiltrate.  The patient's known  right upper lobe tiny nodule is better evaluated on prior CT, not definitely  seen by radiograph given the size, but there are no suspicious developing  abnormalities at this level. No acute osseous abnormalities are seen. Impression  No acute disease. Emphysema. CT WITHOUT CONTRAST: No results found for this or any previous visit from the past 365 days. CT WITH CONTRAST: No results found for this or any previous visit from the past 365 days. CT HIGH RES: No results found for this or any previous visit from the past 365 days. CT PE PROTOCOL: No results found for this or any previous visit from the past 365 days. LDCT SCREENING: CT LUNG SCREENING 07/11/2022    Narrative  CT lung screening 7/11/2022    INDICATION:  40+ pack year history of tobacco use. Current smoker. Multiple axial images were obtained through the chest without IV contrast.  Low  dose CT protocol was used. Radiation dose reduction techniques were used for  this study: All CT scans performed at this facility use one or all of the  following: Automated exposure control, adjustment of the mA and/or kVp according  to patient's size, iterative reconstruction. COMPARISON: Low-dose screening lung CT 4/22/2021    FINDINGS:  No new suspicious memory mass or significant nodule by follow-up screening lung  CT criteria is seen. The prior 3 mm right upper lobe nodule is not clearly  demonstrated and should occur on approximately axial detailed image 84. The  central airways are patent. Stable moderate to advanced panacinar appearing  emphysematous changes are seen of the lungs. There is no significant mediastinal or axillary adenopathy. There are no bony  lesions. Bilateral breast implants are present. Limited imaging of the upper  abdomen is unremarkable. Impression  1. No new suspicious pulmonary lesion. The prior 3 mm right upper lobe nodule  appears improved. Continued annual low-dose screening lung CT is recommended  until the patient is no longer a candidate for definitive treatment.     Please note that a negative screening lung CT does not mean the patient does  not have lung cancer    L1 Negative: No nodules or nodules with specific calcifications such as  complete, central, popcorn, concentric rings, and fat containing nodules. Continue annual screening with noncontrast chest CT using LDCT protocol in 12  months.    7/11/22      PET SCAN: No results found for this or any previous visit from the past 365 days. PFTs:   No flowsheet data found. Spirometry:            Date:     5/27/21               FVC     3.27-96               FEV1     2.18-81               FEV1/FVC     67               FEF 25-75%     1.22-47               Bronchodilator Response    negative                TLC     6.               RV     2.               DLCO     12.2-46                     Exercise Oximetry:   2/15/23  Resting RA Sat - 96  Lowest ambulating RA Sat - 92      Echo: 03/21/22    TRANSTHORACIC ECHOCARDIOGRAM (TTE) COMPLETE (CONTRAST/BUBBLE/3D PRN) 03/21/2022  7:28 PM (Final)    Narrative  This is a summary report. The complete report is available in the patient's medical record. If you cannot access the medical record, please contact the sending organization for a detailed fax or copy. Left Ventricle: Left ventricle size is normal. Normal wall thickness. Normal left ventricular systolic function with a visually estimated EF of 65 - 70%. Normal diastolic function.     Signed by: Alondra Kaminski MD on 3/21/2022  7:28 PM    PM Reference Info:                                                                                                                  Past Medical History:   Diagnosis Date    Ankylosing spondylitis ([facility] [address]     Bronchitis     Spinal stenosis       Tobacco Use: High Risk    Smoking Tobacco Use: Every Day    Smokeless Tobacco Use: Former    Passive Exposure: Not on file     Allergies   Allergen Reactions    Penicillins Rash     Current Outpatient Medications   Medication Instructions    albuterol sulfate HFA (PROAIR HFA) 108 (90 Base) MCG/ACT inhaler 2 puffs, Inhalation, EVERY 6 HOURS PRN    diphenoxylate-atropine (LOMOTIL) 2.5-0.025 MG per tablet 2    Erenumab-aooe (AIMOVIG) 140 MG/ML SOAJ INJECT 140 MG SUBCUTANEOUSLY EVERY 30 DAYS (MIGRAINE)    fluticasone-vilanterol (BREO ELLIPTA) 100-25 MCG/INH AEPB inhaler 1 puff, Inhalation, DAILY    HYDROcodone-acetaminophen (NORCO)  MG per tablet 1 tablet as needed    LORazepam (ATIVAN) 0.5 MG tablet 1 tablet as needed    Metoprolol Succinate 25 MG CS24 1 tablet    SUMAtriptan (IMITREX) 100 MG tablet 1 tablet as needed    topiramate (TOPAMAX SPRINKLE) 25 MG capsule 2    varenicline (CHANTIX) 0.5 MG tablet Days 1 to 3: 0.5mg once a day Days 4 to 7: 0.5mg twice a day Day 8 and after: 1mg twice a day.     varenicline (CHANTIX) 1 mg, Oral, 2 TIMES DAILY    VORTIoxetine (TRINTELLIX) 10 mg, Oral, DAILY

## 2023-02-21 ENCOUNTER — TELEPHONE (OUTPATIENT)
Dept: PULMONOLOGY | Age: 63
End: 2023-02-21

## 2023-02-21 NOTE — TELEPHONE ENCOUNTER
PA for Chantix sent to [employer] via [employer]. Key: [id number]    Your request has been denied  Request Reference Number: [id number]. VARENICLINE TAB 0.5MG is denied for not meeting the prior authorization requirement(s). Per your health plan's criteria, this drug is covered if you meet the following:  (1) Your doctor provides medical records (document drug, duration, dose and date of use) showing that you have a history of using at least two covered (formulary) drugs (if two or more are available) (if request is for a combination product, you must have documentation indicating  concurrent use of separate drugs). Covered drugs include:  (a) Generic Zyban (bupropion). (b) Nicotine gum, nicotine lozenge, or nicotine transdermal patch.

## 2023-03-01 NOTE — TELEPHONE ENCOUNTER
Would offer her zyban as an alternative if she is willing. If not then it looks like she has to try nicotine replacement over the counter first before her plan will cover chantix.      Dusty Sampson MD

## 2023-03-01 NOTE — TELEPHONE ENCOUNTER
Called patient and informed her that her insurance has denied the Chantix and is requiring her to 1st try generic Zyban and OTC gum, lozenges and patches. She states she has already tried and failed all of these. She was on generic Zyban when she was living in [place] and she has tried all the OTC products since moving to [place]. I have sent another re-consideration to [facility] to see if we can get the Chantix approved.

## 2023-03-16 ENCOUNTER — TELEPHONE (OUTPATIENT)
Dept: PULMONOLOGY | Age: 63
End: 2023-03-16

## 2023-03-16 NOTE — TELEPHONE ENCOUNTER
Dr Brian Walton, patient left message on refill line, said Chantix not approved by insurance until she tries Zyban and OTC products.  She said she did before but no success but she is willing to give it a try again so she complies with insurance requirements, she requested if you can send it to her pharmacy, thanks Ashley Hernandez

## 2023-03-24 RX ORDER — BUPROPION HYDROCHLORIDE 150 MG/1
TABLET, EXTENDED RELEASE ORAL
Qty: 60 TABLET | Refills: 2 | Status: SHIPPED
Start: 2023-03-24 | End: 2023-03-27 | Stop reason: ALTCHOICE

## 2023-03-24 RX ORDER — BUPROPION HYDROCHLORIDE 150 MG/1
TABLET, EXTENDED RELEASE ORAL
Qty: 30 TABLET | Refills: 2 | Status: SHIPPED | OUTPATIENT
Start: 2023-03-24 | End: 2023-06-22

## 2023-03-27 ENCOUNTER — TELEPHONE (OUTPATIENT)
Dept: PULMONOLOGY | Age: 63
End: 2023-03-27

## 2023-03-27 RX ORDER — BUPROPION HYDROCHLORIDE 150 MG/1
150 TABLET, EXTENDED RELEASE ORAL 2 TIMES DAILY
Qty: 60 TABLET | Refills: 2 | Status: SHIPPED | OUTPATIENT
Start: 2023-03-27

## 2023-03-27 NOTE — TELEPHONE ENCOUNTER
Pharmacy has questions concerning the 2 prescriptions for :    buPROPion (WELLBUTRIN SR) 150 MG extended release tablet     buPROPion (ZYBAN) 150 MG extended release tablet

## 2023-03-27 NOTE — TELEPHONE ENCOUNTER
Spoke with [facility] [employer] and they stated that they were both the same medication with the same sig. Dr. Kelsey Huff has been made aware and he will fix the prescription. No further questions or concerns were asked at this time.  // Edgar Jarrell

## 2023-06-20 ENCOUNTER — TELEPHONE (OUTPATIENT)
Dept: PULMONOLOGY | Age: 63
End: 2023-06-20

## 2023-06-26 RX ORDER — VARENICLINE TARTRATE 1 MG/1
1 TABLET, FILM COATED ORAL 2 TIMES DAILY
Qty: 60 TABLET | Refills: 1 | Status: SHIPPED | OUTPATIENT
Start: 2023-06-26 | End: 2023-09-24

## 2023-06-26 RX ORDER — VARENICLINE TARTRATE 0.5 MG/1
TABLET, FILM COATED ORAL
Qty: 1 BOX | Refills: 0 | Status: SHIPPED | OUTPATIENT
Start: 2023-06-26 | End: 2023-06-26 | Stop reason: SDUPTHER

## 2023-06-26 RX ORDER — VARENICLINE TARTRATE 1 MG/1
1 TABLET, FILM COATED ORAL 2 TIMES DAILY
Qty: 60 TABLET | Refills: 1 | Status: SHIPPED | OUTPATIENT
Start: 2023-06-26 | End: 2023-06-26 | Stop reason: SDUPTHER

## 2023-06-26 RX ORDER — VARENICLINE TARTRATE 0.5 MG/1
TABLET, FILM COATED ORAL
Qty: 1 BOX | Refills: 0 | Status: SHIPPED | OUTPATIENT
Start: 2023-06-26

## 2023-07-03 ENCOUNTER — OFFICE VISIT (OUTPATIENT)
Dept: NEUROSURGERY | Age: 63
End: 2023-07-03
Payer: COMMERCIAL

## 2023-07-03 VITALS
HEART RATE: 73 BPM | OXYGEN SATURATION: 95 % | BODY MASS INDEX: 18.85 KG/M2 | DIASTOLIC BLOOD PRESSURE: 63 MMHG | HEIGHT: 65 IN | WEIGHT: 113.13 LBS | SYSTOLIC BLOOD PRESSURE: 113 MMHG | TEMPERATURE: 98.8 F

## 2023-07-03 DIAGNOSIS — M50.30 DEGENERATIVE DISC DISEASE, CERVICAL: ICD-10-CM

## 2023-07-03 DIAGNOSIS — M54.12 CERVICAL RADICULOPATHY: Primary | ICD-10-CM

## 2023-07-03 DIAGNOSIS — M48.02 NEUROFORAMINAL STENOSIS OF CERVICAL SPINE: ICD-10-CM

## 2023-07-03 PROCEDURE — 99204 OFFICE O/P NEW MOD 45 MIN: CPT | Performed by: NEUROLOGICAL SURGERY

## 2023-07-03 RX ORDER — GABAPENTIN 300 MG/1
300 CAPSULE ORAL 3 TIMES DAILY
COMMUNITY

## 2023-08-23 RX ORDER — FLUTICASONE FUROATE AND VILANTEROL TRIFENATATE 100; 25 UG/1; UG/1
1 POWDER RESPIRATORY (INHALATION) DAILY
Qty: 1 EACH | Refills: 11 | Status: SHIPPED | OUTPATIENT
Start: 2023-08-23

## 2023-08-23 NOTE — TELEPHONE ENCOUNTER
Patient Refill Request     Last Office Visit:  02/15/2023 with Dr. Ana Paula Cueva     When they were supposed to follow up: 6 months     Upcoming Office Visit: 09/20/23 with Dr. Sravani Martinez Requested: Breo 100 mcg     Type of refill: 30 day     Requested Pharmacy: [address] [place] [address]     Is prescription pended?  Yes

## 2023-09-11 ENCOUNTER — PROCEDURE VISIT (OUTPATIENT)
Dept: PHYSICAL MEDICINE AND REHAB | Age: 63
End: 2023-09-11
Payer: COMMERCIAL

## 2023-09-11 ENCOUNTER — TELEPHONE (OUTPATIENT)
Dept: PULMONOLOGY | Age: 63
End: 2023-09-11

## 2023-09-11 VITALS — DIASTOLIC BLOOD PRESSURE: 90 MMHG | SYSTOLIC BLOOD PRESSURE: 134 MMHG | HEART RATE: 83 BPM

## 2023-09-11 DIAGNOSIS — Z87.891 HISTORY OF NICOTINE DEPENDENCE: ICD-10-CM

## 2023-09-11 DIAGNOSIS — G56.02 LEFT CARPAL TUNNEL SYNDROME: Primary | ICD-10-CM

## 2023-09-11 DIAGNOSIS — Z12.9 SCREENING FOR CANCER: Primary | ICD-10-CM

## 2023-09-11 PROCEDURE — 95886 MUSC TEST DONE W/N TEST COMP: CPT | Performed by: PHYSICAL MEDICINE & REHABILITATION

## 2023-09-11 PROCEDURE — 95909 NRV CNDJ TST 5-6 STUDIES: CPT | Performed by: PHYSICAL MEDICINE & REHABILITATION

## 2023-09-11 NOTE — TELEPHONE ENCOUNTER
Called the patient and notified her that I fixed the CT scan to be a LDCT instead. Patient understood and stated that she will give Radiology Scheduling a call and will get the scan scheduled soon. No further questions or concerns were asked at this time.  // Rony Chavez

## 2023-09-11 NOTE — TELEPHONE ENCOUNTER
Radiology needs to know if the patient needs a cancer lung screening are if it needs to be the CT scan w/o contrast . Patient is telling her that it suppose too be a cancer screening

## 2023-09-13 ENCOUNTER — HOSPITAL ENCOUNTER (OUTPATIENT)
Dept: CT IMAGING | Age: 63
Discharge: HOME | End: 2023-09-16
Attending: INTERNAL MEDICINE
Payer: COMMERCIAL

## 2023-09-13 VITALS — HEIGHT: 65 IN | BODY MASS INDEX: 19.16 KG/M2 | WEIGHT: 115 LBS

## 2023-09-13 DIAGNOSIS — Z12.9 SCREENING FOR CANCER: ICD-10-CM

## 2023-09-13 DIAGNOSIS — Z87.891 HISTORY OF NICOTINE DEPENDENCE: ICD-10-CM

## 2023-09-13 PROCEDURE — 71271 CT THORAX LUNG CANCER SCR C-: CPT

## 2023-09-20 ENCOUNTER — OFFICE VISIT (OUTPATIENT)
Dept: PULMONOLOGY | Age: 63
End: 2023-09-20
Payer: COMMERCIAL

## 2023-09-20 VITALS
HEIGHT: 65 IN | WEIGHT: 117 LBS | HEART RATE: 78 BPM | BODY MASS INDEX: 19.49 KG/M2 | SYSTOLIC BLOOD PRESSURE: 120 MMHG | RESPIRATION RATE: 20 BRPM | DIASTOLIC BLOOD PRESSURE: 80 MMHG | TEMPERATURE: 98 F | OXYGEN SATURATION: 97 %

## 2023-09-20 DIAGNOSIS — R91.8 ENDOBRONCHIAL MASS: Primary | ICD-10-CM

## 2023-09-20 DIAGNOSIS — I48.91 ATRIAL FIBRILLATION, UNSPECIFIED TYPE (HCC): ICD-10-CM

## 2023-09-20 DIAGNOSIS — J44.9 CHRONIC OBSTRUCTIVE PULMONARY DISEASE, UNSPECIFIED COPD TYPE (HCC): ICD-10-CM

## 2023-09-20 DIAGNOSIS — Z87.891 PERSONAL HISTORY OF NICOTINE DEPENDENCE: ICD-10-CM

## 2023-09-20 PROCEDURE — 99214 OFFICE O/P EST MOD 30 MIN: CPT | Performed by: INTERNAL MEDICINE

## 2023-09-20 PROCEDURE — 99406 BEHAV CHNG SMOKING 3-10 MIN: CPT | Performed by: INTERNAL MEDICINE

## 2023-09-20 RX ORDER — VARENICLINE TARTRATE 0.5 MG/1
.5-1 TABLET, FILM COATED ORAL SEE ADMIN INSTRUCTIONS
Qty: 57 TABLET | Refills: 0 | Status: SHIPPED | OUTPATIENT
Start: 2023-09-20

## 2023-09-20 RX ORDER — VARENICLINE TARTRATE 1 MG/1
1 TABLET, FILM COATED ORAL 2 TIMES DAILY
Qty: 60 TABLET | Refills: 5 | Status: SHIPPED | OUTPATIENT
Start: 2023-10-21

## 2023-09-20 NOTE — PROGRESS NOTES
[address] Bobby Dunbar. [address] [facility], [address] [place] [address]  [phone]    Patient Name:  Pattie Dumont      YOB: 1960  Office Visit 9/20/2023    ASSESSMENT AND PLAN:  (Medical Decision Making)    Pt with history of ongoing tobacco use resulting in COPD with mild obstruction on spirometry but significant centrilobular emphysema seen on CT scan and severe reduction in DLCO. Very interested in quitting smoking but has tried and failed nicotine patches, nicotine gum, and zyban without success. Also reporting an episode of tachycardia up to 160 documented on her home BP cuff associated with palpitations. Suspect A fib which would be a new diagnosis for her.       -repeat CT in 3 months to monitor endobronchial abnormality seen on recent screening CT.   -refer to cards for likely a fib.   -rx for chantix sent to pharmacy. Needs prior auth.   -Total smoking cessation is advised. Patient's tobacco use confirmed as documented in the medical record. Discussed various smoking cessation products including pills, patches, inhaler, gum, weaning self off, \"cold turkey\", and smoking cessation classes. Discussed also with patient disease risk of ongoing smoking including CVA, lung cancer, and heart disease. At this point, patient's commitment to quitting is high. 7 minutes were spent counseling patient regarding tobacco cessation.  -cont breo and prn albuterol. F/u in 6 months. Diagnoses and all orders for this visit:  Endobronchial mass  -     CT CHEST WO CONTRAST; Future  Atrial fibrillation, unspecified type ([address])  -     [place]  Personal history of nicotine dependence  Chronic obstructive pulmonary disease, unspecified COPD type ([address])  Other orders  -     varenicline (CHANTIX) 0.5 MG tablet;  Take 1-2 tablets by mouth See Admin Instructions 0.5mg DAILY for 3 days followed by 0.5mg TWICE DAILY for 4 days followed by 1mg TWICE

## 2023-09-25 ENCOUNTER — TELEPHONE (OUTPATIENT)
Dept: PULMONOLOGY | Age: 63
End: 2023-09-25

## 2023-09-25 NOTE — TELEPHONE ENCOUNTER
PA for Chantix sent to [employer] via [employer]. Key: [id number]    Your request has been denied  Request Reference Number: [id number]. VARENICLINE TAB 0.5MG is denied for not meeting the prior authorization requirement(s). This request was denied because you did not meet the following requirements: The requested medication is not covered because it is not on the listing or formulary of approved drugs for your plan benefit. Per your health plan's criteria, this drug is covered if you meet the following: Your doctor provides medical records (document drug, duration, dose and date of use) showing that you have a history of using at least one covered (formulary) drug. Covered drugs include nicotine patch, gum, or lozenge. The information provided does not show that you meet the criteria listed above.

## 2023-09-29 ENCOUNTER — INITIAL CONSULT (OUTPATIENT)
Age: 63
End: 2023-09-29
Payer: COMMERCIAL

## 2023-09-29 VITALS
WEIGHT: 115 LBS | DIASTOLIC BLOOD PRESSURE: 78 MMHG | HEART RATE: 85 BPM | BODY MASS INDEX: 19.16 KG/M2 | SYSTOLIC BLOOD PRESSURE: 112 MMHG | HEIGHT: 65 IN

## 2023-09-29 DIAGNOSIS — I10 PRIMARY HYPERTENSION: ICD-10-CM

## 2023-09-29 DIAGNOSIS — G47.33 OSA AND COPD OVERLAP SYNDROME (HCC): ICD-10-CM

## 2023-09-29 DIAGNOSIS — J44.9 OSA AND COPD OVERLAP SYNDROME (HCC): ICD-10-CM

## 2023-09-29 DIAGNOSIS — R00.0 TACHYCARDIA: Primary | ICD-10-CM

## 2023-09-29 PROCEDURE — 99204 OFFICE O/P NEW MOD 45 MIN: CPT | Performed by: INTERNAL MEDICINE

## 2023-09-29 PROCEDURE — 3074F SYST BP LT 130 MM HG: CPT | Performed by: INTERNAL MEDICINE

## 2023-09-29 PROCEDURE — 3078F DIAST BP <80 MM HG: CPT | Performed by: INTERNAL MEDICINE

## 2023-09-29 PROCEDURE — 93000 ELECTROCARDIOGRAM COMPLETE: CPT | Performed by: INTERNAL MEDICINE

## 2023-09-29 RX ORDER — FAMOTIDINE 20 MG/1
TABLET, FILM COATED ORAL
COMMUNITY
Start: 2023-08-27

## 2023-09-29 RX ORDER — OMEPRAZOLE 20 MG/1
CAPSULE, DELAYED RELEASE ORAL
COMMUNITY
Start: 2023-08-27

## 2023-09-29 RX ORDER — CELECOXIB 100 MG/1
CAPSULE ORAL
COMMUNITY
Start: 2023-08-27

## 2023-09-29 RX ORDER — ONDANSETRON 4 MG/1
4 TABLET, ORALLY DISINTEGRATING ORAL 3 TIMES DAILY PRN
COMMUNITY
Start: 2023-07-11

## 2023-09-29 RX ORDER — LORATADINE 10 MG/1
10 CAPSULE, LIQUID FILLED ORAL DAILY
COMMUNITY

## 2023-09-29 RX ORDER — CYCLOBENZAPRINE HCL 10 MG
TABLET ORAL
COMMUNITY
Start: 2023-09-19

## 2023-09-29 RX ORDER — METHYLPREDNISOLONE 4 MG/1
TABLET ORAL
COMMUNITY
Start: 2023-07-27

## 2023-09-29 ASSESSMENT — ENCOUNTER SYMPTOMS
ALLERGIC/IMMUNOLOGIC NEGATIVE: 1
GASTROINTESTINAL NEGATIVE: 1
CHEST TIGHTNESS: 0
PHOTOPHOBIA: 0
EYE PAIN: 0
BACK PAIN: 0
ABDOMINAL PAIN: 0
SHORTNESS OF BREATH: 1
EYES NEGATIVE: 1

## 2023-09-29 NOTE — PROGRESS NOTES
[facility], [address]  PHONE: [phone]    Shekhar Murray  1960    SUBJECTIVE:   Shekhar Murray is a 63 y.o. female seen for initial evaluation of:      Chief Complaint   Patient presents with    Consultation    Tachycardia    Palpitations        Cardiac Hx (Reviewed and summarized by me):  1) COPD and tobacco abuse x1.5 ppd  2) ? Afib  3) Echo 3/21/22 - LVEF 65-70% normal LVDF normal LA size  4) Aunt with afib  5) works as a  - very active    HPI:  82-year-old female followed by Dr. Ibeth Armas for smoking induced COPD. She reports an event where her Apple Watch showed her heart racing at 160 bpm a few nights ago. She felt like she should go to urgent care she waited approximately 30 minutes and the heart rate slowly ease back down to a normal range. She has not had episodes since then. She is referred to us for concerns about possible A-fib. An echocardiogram done in 2022 showed normal ejection fraction normal diastolic function and normal atrial sizes. She has family history of A-fib and an aunt but none in a first-degree relative. She works as a  and is very active she does not exercise. She continues to smoke 1.5 packs a day. ECG: NSR normal axis, no ischemic changes (Independent review/interpretation by me)      Past Medical History, Past Surgical History, Family history, Social History, and Medications were all reviewed with the patient today and updated as necessary.        Current Outpatient Medications:     loratadine (CLARITIN) 10 MG capsule, Take 1 capsule by mouth daily, Disp: , Rfl:     celecoxib (CELEBREX) 100 MG capsule, , Disp: , Rfl:     cyclobenzaprine (FLEXERIL) 10 MG tablet, , Disp: , Rfl:     famotidine (PEPCID) 20 MG tablet, , Disp: , Rfl:     ondansetron (ZOFRAN-ODT) 4 MG disintegrating tablet, Place 1 tablet under the tongue 3 times daily as needed, Disp: , Rfl:     omeprazole (PRILOSEC) 20 MG delayed

## 2023-09-29 NOTE — TELEPHONE ENCOUNTER
Need addendum to last chart note stating patient has tried and failed Nicotine Patch 21mg from 1/1/23-3/31/23 and 6/1/23-9-26/23, Nicotine Gum 4mg from 4/1/23-7/31/23 and Wellbutrin 150mg fron 3/24/23-7/3/23 without success. This needs to be documented in her notes before [employer] will approve the Chantix. Dr. Kimber Vega will be informed.

## 2024-03-07 ENCOUNTER — HOSPITAL ENCOUNTER (OUTPATIENT)
Dept: CT IMAGING | Age: 64
Discharge: HOME | End: 2024-03-07
Attending: INTERNAL MEDICINE
Payer: COMMERCIAL

## 2024-03-07 DIAGNOSIS — R91.8 ENDOBRONCHIAL MASS: ICD-10-CM

## 2024-03-07 PROCEDURE — 71250 CT THORAX DX C-: CPT

## 2024-03-08 ENCOUNTER — TELEMEDICINE (OUTPATIENT)
Dept: PULMONOLOGY | Age: 64
End: 2024-03-08
Payer: COMMERCIAL

## 2024-03-08 ENCOUNTER — TELEPHONE (OUTPATIENT)
Dept: PULMONOLOGY | Age: 64
End: 2024-03-08

## 2024-03-08 DIAGNOSIS — Z87.891 HISTORY OF CIGARETTE SMOKING: ICD-10-CM

## 2024-03-08 DIAGNOSIS — T17.500A MUCUS PLUGGING OF BRONCHI: ICD-10-CM

## 2024-03-08 DIAGNOSIS — J44.9 CHRONIC OBSTRUCTIVE PULMONARY DISEASE, UNSPECIFIED COPD TYPE (HCC): Primary | ICD-10-CM

## 2024-03-08 PROCEDURE — 99214 OFFICE O/P EST MOD 30 MIN: CPT | Performed by: INTERNAL MEDICINE

## 2024-03-08 PROCEDURE — 99406 BEHAV CHNG SMOKING 3-10 MIN: CPT | Performed by: INTERNAL MEDICINE

## 2024-03-08 NOTE — TELEPHONE ENCOUNTER
Patient seen the results on My Chart from her CT scan yesterday . She is very upset and would like to speak with Dr. Peng are get an appt asap . She is very upset

## 2024-03-08 NOTE — PROGRESS NOTES
HYDROcodone-acetaminophen (NORCO)  MG per tablet 1 tablet as needed    loratadine (CLARITIN) 10 mg, Oral, DAILY    LORazepam (ATIVAN) 0.5 MG tablet 1 tablet as needed    methylPREDNISolone (MEDROL DOSEPACK) 4 MG tablet Indications: PRN ONLY    Metoprolol Succinate 25 MG CS24 1 tablet    omeprazole (PRILOSEC) 20 MG delayed release capsule No dose, route, or frequency recorded.    ondansetron (ZOFRAN-ODT) 4 mg, SubLINGual, 3 TIMES DAILY PRN    SUMAtriptan (IMITREX) 100 MG tablet 1 tablet as needed    varenicline (CHANTIX) 0.5-1 mg, Oral, SEE ADMIN INSTRUCTIONS, 0.5mg DAILY for 3 days followed by 0.5mg TWICE DAILY for 4 days followed by 1mg TWICE DAILY    varenicline (CHANTIX) 1 mg, Oral, 2 TIMES DAILY    VORTIoxetine (TRINTELLIX) 10 mg, Oral, DAILY

## 2024-03-11 ENCOUNTER — TELEPHONE (OUTPATIENT)
Dept: PULMONOLOGY | Age: 64
End: 2024-03-11

## 2024-03-11 NOTE — TELEPHONE ENCOUNTER
----- Message from Bianca Lai MD sent at 3/8/2024  3:26 PM EST -----  Anything you can do for PA for chantix for this lady?    PA for Chantix has been approved and valid from January 1, 2024 to September 11, 2024

## 2024-04-11 NOTE — PROGRESS NOTES
the patient record exclusive of procedures.       Earnest Cunningham MD  Sleep Medicine/Internal Medicine/Pediatrics

## 2024-04-12 ENCOUNTER — OFFICE VISIT (OUTPATIENT)
Dept: SLEEP MEDICINE | Age: 64
End: 2024-04-12
Payer: COMMERCIAL

## 2024-04-12 VITALS
DIASTOLIC BLOOD PRESSURE: 77 MMHG | BODY MASS INDEX: 21.49 KG/M2 | HEART RATE: 69 BPM | TEMPERATURE: 98.2 F | HEIGHT: 65 IN | OXYGEN SATURATION: 93 % | RESPIRATION RATE: 16 BRPM | SYSTOLIC BLOOD PRESSURE: 128 MMHG | WEIGHT: 129 LBS

## 2024-04-12 DIAGNOSIS — R00.0 TACHYCARDIA: ICD-10-CM

## 2024-04-12 DIAGNOSIS — Z72.0 TOBACCO ABUSE: ICD-10-CM

## 2024-04-12 DIAGNOSIS — J44.9 OSA AND COPD OVERLAP SYNDROME (HCC): Primary | ICD-10-CM

## 2024-04-12 DIAGNOSIS — G47.33 OSA AND COPD OVERLAP SYNDROME (HCC): Primary | ICD-10-CM

## 2024-04-12 PROCEDURE — 99204 OFFICE O/P NEW MOD 45 MIN: CPT | Performed by: INTERNAL MEDICINE

## 2024-04-12 PROCEDURE — 3078F DIAST BP <80 MM HG: CPT | Performed by: INTERNAL MEDICINE

## 2024-04-12 PROCEDURE — 3074F SYST BP LT 130 MM HG: CPT | Performed by: INTERNAL MEDICINE

## 2024-04-12 RX ORDER — LANOLIN ALCOHOL/MO/W.PET/CERES
325 CREAM (GRAM) TOPICAL
COMMUNITY

## 2024-04-12 RX ORDER — ZINC PICOLINATE
50 POWDER (GRAM) MISCELLANEOUS
COMMUNITY

## 2024-04-12 ASSESSMENT — SLEEP AND FATIGUE QUESTIONNAIRES
HOW LIKELY ARE YOU TO NOD OFF OR FALL ASLEEP WHILE SITTING INACTIVE IN A PUBLIC PLACE: MODERATE CHANCE OF DOZING
HOW LIKELY ARE YOU TO NOD OFF OR FALL ASLEEP WHILE SITTING AND TALKING TO SOMEONE: WOULD NEVER DOZE
HOW LIKELY ARE YOU TO NOD OFF OR FALL ASLEEP WHILE SITTING AND READING: HIGH CHANCE OF DOZING
HOW LIKELY ARE YOU TO NOD OFF OR FALL ASLEEP IN A CAR, WHILE STOPPED FOR A FEW MINUTES IN TRAFFIC: WOULD NEVER DOZE
HOW LIKELY ARE YOU TO NOD OFF OR FALL ASLEEP WHILE WATCHING TV: HIGH CHANCE OF DOZING
HOW LIKELY ARE YOU TO NOD OFF OR FALL ASLEEP WHILE SITTING QUIETLY AFTER LUNCH WITHOUT ALCOHOL: HIGH CHANCE OF DOZING
HOW LIKELY ARE YOU TO NOD OFF OR FALL ASLEEP WHEN YOU ARE A PASSENGER IN A CAR FOR AN HOUR WITHOUT A BREAK: MODERATE CHANCE OF DOZING
HOW LIKELY ARE YOU TO NOD OFF OR FALL ASLEEP WHILE LYING DOWN TO REST IN THE AFTERNOON WHEN CIRCUMSTANCES PERMIT: HIGH CHANCE OF DOZING
ESS TOTAL SCORE: 16

## 2024-04-12 NOTE — ASSESSMENT & PLAN NOTE
She had what sounds like possible atrial fibrillation, needs to reconnect with her cardiologist which sounds like it is at [facility].  This association with atrial fibrillation and obstructive sleep apnea discussed.  Importance of CPAP use underscored.  She is in regular rhythm today.

## 2024-04-12 NOTE — PATIENT INSTRUCTIONS
alcohol-free baby wipe    Once per Week:  Mask (frame/headgear): wash with soap/water   Filter: check for cleanliness, Replace (do not wash) monthly, or more often whenever marty/dirty    Twice a month   Water Tub / Tubing: soak for 20-30 minutes in solution then rinse          Soaking Options: 1) water/vinegar solution (3 parts water, 1 part vinegar)     Some companies may tell you to add a little bit of bleach. However I find that bleach to be too toxic and difficult to rinse adequately.    Supply Replacement Schedule (what insurance will cover)    You may not need to replace all parts this frequently if you are doing proper cleaning.  Filters: 2 per month  Nasal Cushion/Pillow: 2 per month  Full Face cushion: 1 per month  Tubin every 3 months  Full Mask: 1 every 6 months  Headgear: 1 every 6 months  Water Chamber: 1 every 6 months  Chinstrap: 1 every 6 months      Many patients struggle to find comfortable heat and humidity settings on their device.  If you are having issues with condensation in your mask or tubing, it usually means your temperature is too low, and/or your humidity is too high.     If you are experiencing nasal congestion, it usually means you would benefit from higher humidity setting.  Most people on nasal pillows are more comfortable if the heat and humidity are relatively high, such as 84-86 degrees, and 5-6 humidity.  You can adjust these settings yourself to find what is comfortable for you.  You can always call us or your equipment company for help, as well.  Contact your equipment vendor for replacement supplies whenever in need.

## 2024-04-12 NOTE — ASSESSMENT & PLAN NOTE
Patient fell out of the habit of using CPAP as she got frustrated with the suppliers at her [employer] company and wants to go to a different 1.  She is currently on CPAP 7-10 cm H2O and the little times her machine has been used her AHI has been well-controlled and we will continue the settings.  We will send her over to [facility] for a mask fitting and to establish care and see her back in 3 to 4 months.  She is very symptomatic with excessive daytime sleepiness and ESS of 16 and morning headaches as well as nocturia.  We also discussed how after menopause sleep apnea tends to get worse.

## 2024-04-12 NOTE — ASSESSMENT & PLAN NOTE
Ongoing cessation efforts encouraged, we discussed getting all cigarettes out of the house when she picks her quit date.  Time spent with cessation counseling 3 minutes.

## 2024-05-24 ENCOUNTER — OFFICE VISIT (OUTPATIENT)
Dept: PULMONOLOGY | Age: 64
End: 2024-05-24
Payer: COMMERCIAL

## 2024-05-24 VITALS
SYSTOLIC BLOOD PRESSURE: 115 MMHG | HEIGHT: 65 IN | WEIGHT: 129 LBS | TEMPERATURE: 97.2 F | DIASTOLIC BLOOD PRESSURE: 60 MMHG | BODY MASS INDEX: 21.49 KG/M2 | OXYGEN SATURATION: 95 % | HEART RATE: 78 BPM | RESPIRATION RATE: 18 BRPM

## 2024-05-24 DIAGNOSIS — T17.500A MUCUS PLUGGING OF BRONCHI: ICD-10-CM

## 2024-05-24 DIAGNOSIS — I48.91 ATRIAL FIBRILLATION, UNSPECIFIED TYPE (HCC): ICD-10-CM

## 2024-05-24 DIAGNOSIS — J44.9 CHRONIC OBSTRUCTIVE PULMONARY DISEASE, UNSPECIFIED COPD TYPE (HCC): Primary | ICD-10-CM

## 2024-05-24 DIAGNOSIS — Z87.891 PERSONAL HISTORY OF NICOTINE DEPENDENCE: ICD-10-CM

## 2024-05-24 PROCEDURE — 99214 OFFICE O/P EST MOD 30 MIN: CPT | Performed by: INTERNAL MEDICINE

## 2024-05-24 PROCEDURE — 3078F DIAST BP <80 MM HG: CPT | Performed by: INTERNAL MEDICINE

## 2024-05-24 PROCEDURE — 3074F SYST BP LT 130 MM HG: CPT | Performed by: INTERNAL MEDICINE

## 2024-05-24 RX ORDER — VARENICLINE TARTRATE 1 MG/1
1 TABLET, FILM COATED ORAL 2 TIMES DAILY
COMMUNITY
Start: 2023-01-18

## 2024-05-24 RX ORDER — FLUTICASONE FUROATE AND VILANTEROL 100; 25 UG/1; UG/1
1 POWDER RESPIRATORY (INHALATION) DAILY
Qty: 1 EACH | Refills: 11 | Status: SHIPPED | OUTPATIENT
Start: 2024-05-24

## 2024-05-24 RX ORDER — ALBUTEROL SULFATE 90 UG/1
2 AEROSOL, METERED RESPIRATORY (INHALATION) EVERY 6 HOURS PRN
Qty: 1 EACH | Refills: 11 | Status: SHIPPED | OUTPATIENT
Start: 2024-05-24

## 2024-05-24 NOTE — PROGRESS NOTES
[facility]  [address]  [place] [address]  [phone]    Patient Name:  Nela Woodward      YOB: 1960  Office Visit 5/24/2024    ASSESSMENT AND PLAN:  (Medical Decision Making)    Pt with history of ongoing tobacco use resulting in COPD with mild obstruction on spirometry but significant centrilobular emphysema seen on CT scan and severe reduction in DLCO.  Now on chantix and plans to quit tomorrow.   Otherwise stable on breo and prn albuterol.   CT scan with intermittent mucus plugging.   New dx a fib but has not follow up with cardiology yet.     -Refilled her Breo 100 and as needed albuterol.  -She will need to have a repeat CT scan in March 2025. Will order at her next appt.   -Encouraged her to follow-up with cardiology regarding her new A-fib diagnosis.  She will call them next week.  -Using Chantix and plans to quit smoking tomorrow.  Encouraged her in these efforts.      F/u in 6 months.     Diagnoses and all orders for this visit:  Chronic obstructive pulmonary disease, unspecified COPD type (HCC)  Mucus plugging of bronchi  Personal history of nicotine dependence  Atrial fibrillation, unspecified type (HCC)  Other orders  -     fluticasone furoate-vilanterol (BREO ELLIPTA) 100-25 MCG/ACT inhaler; Inhale 1 puff into the lungs daily  -     albuterol sulfate HFA (PROVENTIL;VENTOLIN;PROAIR) 108 (90 Base) MCG/ACT inhaler; Inhale 2 puffs into the lungs every 6 hours as needed for Wheezing        Flaco Peng MD    _________________________________________________________________________    HISTORY OF PRESENT ILLNESS:    Ms. Nela Woodward in our clinic today who presents with a history of Follow-up and COPD    CLAUDIA on CPAP, ongoing tobacco use. A1AT testing was normal. H/o psoriatic arthritis, ankylosing spondylitis.   PFT's June 2021 with ratio 67%, FEV1 81, DLCO 46%.     She is here today for follow up appointment.   At her last appt she

## 2024-07-09 ENCOUNTER — TELEPHONE (OUTPATIENT)
Dept: SLEEP MEDICINE | Age: 64
End: 2024-07-09

## 2024-07-11 ENCOUNTER — OFFICE VISIT (OUTPATIENT)
Age: 64
End: 2024-07-11
Payer: COMMERCIAL

## 2024-07-11 VITALS
WEIGHT: 131.9 LBS | DIASTOLIC BLOOD PRESSURE: 74 MMHG | SYSTOLIC BLOOD PRESSURE: 132 MMHG | HEIGHT: 65 IN | HEART RATE: 67 BPM | BODY MASS INDEX: 21.98 KG/M2

## 2024-07-11 DIAGNOSIS — R00.0 TACHYCARDIA: Primary | ICD-10-CM

## 2024-07-11 DIAGNOSIS — I48.0 PAROXYSMAL A-FIB (HCC): ICD-10-CM

## 2024-07-11 PROCEDURE — 93000 ELECTROCARDIOGRAM COMPLETE: CPT | Performed by: INTERNAL MEDICINE

## 2024-07-11 PROCEDURE — 3075F SYST BP GE 130 - 139MM HG: CPT | Performed by: INTERNAL MEDICINE

## 2024-07-11 PROCEDURE — 99214 OFFICE O/P EST MOD 30 MIN: CPT | Performed by: INTERNAL MEDICINE

## 2024-07-11 PROCEDURE — 3078F DIAST BP <80 MM HG: CPT | Performed by: INTERNAL MEDICINE

## 2024-07-11 ASSESSMENT — ENCOUNTER SYMPTOMS
GASTROINTESTINAL NEGATIVE: 1
CHEST TIGHTNESS: 0
EYE PAIN: 0
RESPIRATORY NEGATIVE: 1
ALLERGIC/IMMUNOLOGIC NEGATIVE: 1
ABDOMINAL PAIN: 0
BACK PAIN: 0
PHOTOPHOBIA: 0
EYES NEGATIVE: 1
SHORTNESS OF BREATH: 0

## 2024-07-11 NOTE — PROGRESS NOTES
2022     Years since quittin.5    Smokeless tobacco: Former    Tobacco comments:     Quit smoking: Used a vape for a while, then returned to cigarettes 0.75-1 pack a day.   Substance Use Topics    Alcohol use: Not Currently       ROS:  Review of Systems   Constitutional: Negative.  Negative for fever.   HENT:  Negative for hearing loss, nosebleeds and tinnitus.    Eyes: Negative.  Negative for photophobia and pain.   Respiratory: Negative.  Negative for chest tightness and shortness of breath.    Cardiovascular:  Positive for palpitations. Negative for chest pain and leg swelling.   Gastrointestinal: Negative.  Negative for abdominal pain.   Endocrine: Negative.  Negative for cold intolerance and heat intolerance.   Genitourinary: Negative.  Negative for dysuria.   Musculoskeletal: Negative.  Negative for back pain and joint swelling.   Skin: Negative.  Negative for rash.   Allergic/Immunologic: Negative.  Negative for immunocompromised state.   Neurological: Negative.  Negative for dizziness, syncope and light-headedness.   Hematological: Negative.  Does not bruise/bleed easily.   Psychiatric/Behavioral: Negative.  Negative for suicidal ideas.            PHYSICAL EXAM:  Physical Exam  Constitutional:       General: She is not in acute distress.     Appearance: She is not ill-appearing.   HENT:      Head: Normocephalic and atraumatic.      Nose: No congestion.      Mouth/Throat:      Mouth: Mucous membranes are moist.   Eyes:      Extraocular Movements: Extraocular movements intact.      Pupils: Pupils are equal, round, and reactive to light.   Cardiovascular:      Rate and Rhythm: Normal rate and regular rhythm.      Heart sounds: No murmur heard.     No friction rub. No gallop.   Pulmonary:      Effort: No respiratory distress.      Breath sounds: No wheezing or rhonchi.   Musculoskeletal:         General: No swelling.      Cervical back: Normal range of motion.      Right lower leg: No edema.      Left

## 2024-07-15 ENCOUNTER — TELEPHONE (OUTPATIENT)
Dept: SLEEP MEDICINE | Age: 64
End: 2024-07-15

## 2024-07-15 NOTE — TELEPHONE ENCOUNTER
Patient says that she has been trying to get new cpap supplies . She is told from the DME that they need report from her SS and office notes . She was in office in April    Pt c/o of sore throat and nasal congestion . Assessed throat slight redness noted in back of throat area. Pt had no fevers chill or sob noted lungs clear.  Called talked with Dr. Orly jones recieved

## 2024-09-05 RX ORDER — FLUTICASONE FUROATE AND VILANTEROL TRIFENATATE 100; 25 UG/1; UG/1
POWDER RESPIRATORY (INHALATION)
Qty: 1 EACH | Refills: 11 | Status: SHIPPED | OUTPATIENT
Start: 2024-09-05

## 2024-10-01 ENCOUNTER — TELEPHONE (OUTPATIENT)
Age: 64
End: 2024-10-01

## 2024-10-01 NOTE — TELEPHONE ENCOUNTER
Pt reports increased afib over the past 6 weeks.   Has taken extra metoprolol succinate 25mg 4-5x and it helps.  Wearing her CPAP regularly and afib episodes still occurring.  Happening intermittently through the day. Max HR  bpm    Per 7/11/2024 office note:  MPRESSION:  1) pafib -continue metoprolol 25 mg daily, low risk for stroke not currently anticoagulated.  2) obstructive sleep apnea I recommended that she get fitted for her CPAP machine.  If this does not resolve her A-fib episodes we will plan on starting her on flecainide 50 mg twice daily  3) we will update her echocardiogram she never got her echo from her initial evaluation.      Triage will review with Dr. Shepherd and call pt back with his response.

## 2024-10-07 NOTE — TELEPHONE ENCOUNTER
César Shepherd MD  You6 days ago       Try flecainide 50 mg BID and continue metoprolol BID.  Check ecg 3 days after starting flecainide.  Arrange follow up, be sure she got the echo.    César Shepherd MD       Triage informed pt of Dr. Shepherd's response. She verbalizes understanding and agrees to plan. Triage called in flecainide to pt requested [facility] pharmacy in [place]. Scheduled appt for EKG first available in [place] 10/14/24. Pt will wait to start flecainide until Friday 10/11/24.     Triage forwarding this note to scheduling department for   echo and            f/u appt with Dr. Shepherd to be scheduled.

## 2024-10-07 NOTE — TELEPHONE ENCOUNTER
Per cover my meds \"This medication or product is on your plan's list of covered drugs. Prior authorization is not required at this time\"

## 2024-10-14 ENCOUNTER — NURSE ONLY (OUTPATIENT)
Age: 64
End: 2024-10-14
Payer: COMMERCIAL

## 2024-10-14 DIAGNOSIS — I48.0 PAROXYSMAL A-FIB (HCC): Primary | ICD-10-CM

## 2024-10-14 PROCEDURE — 93000 ELECTROCARDIOGRAM COMPLETE: CPT | Performed by: INTERNAL MEDICINE

## 2024-10-14 NOTE — PROGRESS NOTES
Flecainide start 10/01/24. Pt stated she has felt slightly fatigue since starting the Flecainide. Pt stated she is working more since losing her work help.     EKG reviewed by  continue meds.

## 2024-10-23 ENCOUNTER — OFFICE VISIT (OUTPATIENT)
Age: 64
End: 2024-10-23
Payer: COMMERCIAL

## 2024-10-23 VITALS
HEART RATE: 60 BPM | BODY MASS INDEX: 22.13 KG/M2 | SYSTOLIC BLOOD PRESSURE: 130 MMHG | DIASTOLIC BLOOD PRESSURE: 82 MMHG | WEIGHT: 133 LBS

## 2024-10-23 DIAGNOSIS — Z72.0 TOBACCO ABUSE: ICD-10-CM

## 2024-10-23 DIAGNOSIS — R00.0 TACHYCARDIA: Primary | ICD-10-CM

## 2024-10-23 DIAGNOSIS — I48.0 PAROXYSMAL A-FIB (HCC): ICD-10-CM

## 2024-10-23 PROCEDURE — 99214 OFFICE O/P EST MOD 30 MIN: CPT | Performed by: INTERNAL MEDICINE

## 2024-10-23 PROCEDURE — 3075F SYST BP GE 130 - 139MM HG: CPT | Performed by: INTERNAL MEDICINE

## 2024-10-23 PROCEDURE — 3079F DIAST BP 80-89 MM HG: CPT | Performed by: INTERNAL MEDICINE

## 2024-10-23 RX ORDER — METOPROLOL SUCCINATE 25 MG/1
25 TABLET, EXTENDED RELEASE ORAL 2 TIMES DAILY
Qty: 180 TABLET | Refills: 3 | Status: SHIPPED | OUTPATIENT
Start: 2024-10-23

## 2024-10-23 RX ORDER — FLECAINIDE ACETATE 50 MG/1
50 TABLET ORAL 2 TIMES DAILY
COMMUNITY

## 2024-10-23 ASSESSMENT — ENCOUNTER SYMPTOMS
RESPIRATORY NEGATIVE: 1
CHEST TIGHTNESS: 0
ABDOMINAL PAIN: 0
ALLERGIC/IMMUNOLOGIC NEGATIVE: 1
SHORTNESS OF BREATH: 0
EYE PAIN: 0
BACK PAIN: 0
GASTROINTESTINAL NEGATIVE: 1
EYES NEGATIVE: 1
PHOTOPHOBIA: 0

## 2024-10-23 NOTE — PROGRESS NOTES
[facility]  [address] [place] [address], [address]  [place] [address]  PHONE: [phone]      10/23/24    NAME:  Nela Woodward  : 1960  MRN: [id number]         SUBJECTIVE:   Nela Woodward is a 64 y.o. female seen for follow up of:      Chief Complaint   Patient presents with    Follow-up        Cardiac Hx (Reviewed and summarized by me):  1) COPD and tobacco abuse x1.5 ppd  2) ? Afib              Monitor 23 - noted to have episodes of afib  3) Echo   3/21/22 - LVEF 65-70% normal LVDF normal LA size  10/10/24 - LVEF 60-65% with normal LVDF  4) Aunt with afib  5) works as a  - very active     ECG: 10/14/24 - NSR normal axis, no ischemic changes (Independent review/interpretation by me)      HPI:  After our last visit she continued to have more episodes of A-fib.  We had her double her metoprolol to 25 mg twice daily and we added 50 mg twice daily of flecainide.  This seems to control her A-fib.    Past Medical History, Past Surgical History, Family history, Social History, and Medications were all reviewed with the patient today and updated as necessary.       Current Outpatient Medications:     metoprolol succinate (TOPROL XL) 25 MG extended release tablet, Take 1 tablet by mouth in the morning and at bedtime, Disp: 180 tablet, Rfl: 3    flecainide (TAMBOCOR) 50 MG tablet, Take 1 tablet by mouth 2 times daily, Disp: , Rfl:     BREO ELLIPTA 100-25 MCG/ACT inhaler, INHALE ONE PUFF BY MOUTH EVERY DAY, Disp: 1 each, Rfl: 11    Cholecalciferol 1.25 MG (40519 UT) TABS, Take 5,000 mg by mouth daily, Disp: , Rfl:     Zinc Picolinate POWD, Take 50 mg by mouth, Disp: , Rfl:     golimumab (SIMPONI ARIA) 50 MG/4ML SOLN, Infuse 4 mLs intravenously every 2 months Once every two months, Disp: , Rfl:     ferrous sulfate (FE TABS 325) 325 (65 Fe) MG EC tablet, Take 1 tablet by mouth 3 times daily (with meals), Disp: , Rfl:     loratadine (CLARITIN) 10 MG capsule, Take 1 capsule by

## 2024-11-11 ENCOUNTER — TELEPHONE (OUTPATIENT)
Dept: PULMONOLOGY | Age: 64
End: 2024-11-11

## 2024-11-11 NOTE — TELEPHONE ENCOUNTER
TRIAGE CALL      Complaint: Coughing/Congestion  Cough: yes  Productive:  no  Bloody Sputum:  no  Increased SOB/Wheezing:  yes  Duration: 1 months  Fever/Chills: no  OTC Meds tried: clariitin

## 2024-11-12 RX ORDER — VARENICLINE TARTRATE 1 MG/1
1 TABLET, FILM COATED ORAL 2 TIMES DAILY
Qty: 60 TABLET | Refills: 5 | Status: SHIPPED | OUTPATIENT
Start: 2024-12-12

## 2024-11-12 RX ORDER — AZITHROMYCIN 250 MG/1
TABLET, FILM COATED ORAL
Qty: 6 TABLET | Refills: 0 | Status: SHIPPED | OUTPATIENT
Start: 2024-11-12 | End: 2024-11-22

## 2024-11-12 RX ORDER — ALBUTEROL SULFATE 90 UG/1
2 INHALANT RESPIRATORY (INHALATION) EVERY 6 HOURS PRN
Qty: 1 EACH | Refills: 11 | Status: SHIPPED | OUTPATIENT
Start: 2024-11-12

## 2024-11-12 RX ORDER — PREDNISONE 20 MG/1
40 TABLET ORAL DAILY
Qty: 10 TABLET | Refills: 0 | Status: SHIPPED | OUTPATIENT
Start: 2024-11-12 | End: 2024-11-17

## 2024-11-12 NOTE — TELEPHONE ENCOUNTER
LOV 2024 Dr Peng COPD, CT intermittent mucus plugging, afib  Chantix  Breo   Albuterol     I have returned patient call. No fever, feels poorly, congestion x few months that has not been bad. Sinus drainage.  R ear plugged up with fluid.  Wheezing some. Symptoms have gotten worse since the weekend.  No known COVID or flu exposure. She is taking Albuterol not everyday but has used -4 times this week. Breo daily. She is taking Claritin. She will start Mucinex per package directions and will use Albuterol QID until back to baseline.  Will start Flonase per package directions. Coughing up yellow secretions.  She used Chantix until prescription  a few months ago and reports she resumed smoking when she called office for refill and was not sent in.  She is aware that I do not see an encounter for refill. Does not have SpO2 monitor.  Reports minimal increase in SOB only with exertion like mopping or going up stairs.  She has reflux but in not on any medications.  Feels reflux is under control with head elevation HS. Discussed how untreated or under treated reflux can affect chest congestion.  She reports that at one time she was on Omeprazole and Famotidine.

## 2024-11-12 NOTE — TELEPHONE ENCOUNTER
I have notified patient of prescriptions per Dr Peng.  While on phone, she reports her Albuterol inhaler is expires.  Chart reviewed and refilled per triage protocol.

## 2024-11-12 NOTE — TELEPHONE ENCOUNTER
With wheeze and congestion with yellow secretions will send in azithro and prednisone. Will refill her chantix as well.     Flaco Peng MD

## 2025-03-28 ENCOUNTER — TELEPHONE (OUTPATIENT)
Age: 65
End: 2025-03-28

## 2025-03-28 NOTE — TELEPHONE ENCOUNTER
Pt notified of Dr Shepherd's response. Appt scheduled 6/25/25 in the [place] office.     Patient advised to monitor her BP & HR 2-3 hrs after taking her morning Metoprolol dose, keep a log to bring to her f/u appt. I asked her to let us know if she has any problems prior to her appt. She agreed.

## 2025-03-28 NOTE — TELEPHONE ENCOUNTER
Reports BP elevated x 3 weeks. Started after steroid injection. She states BP always a little elevated after injection but usually goes back down after a couple of weeks. Its been 3 weeks and BP still elevated. Also c/o severe HA.     BP this am- 177/97 HR 97 Before Metoprolol Succinate.     She normally takes Metoprolol Succinate 25mg BID but states she took an extra 1/2 this morning at 6:00am. At 9:00am BP down to 146/80 HR 75.     Patient asking if she should keep taking Metoprolol Succinate 1 1/2 tabs in the am & 1 tab in the pm.

## 2025-03-28 NOTE — TELEPHONE ENCOUNTER
Patient called stating she has the following issues :    BP trending higher  Onset 3 weeks  Occurred concurrently with spinal pain injection three weeks ago  Headaches severe , every day all day  /97   97  this AM  Increased dosage of metoprolol by 1/2 tablet?

## 2025-03-28 NOTE — TELEPHONE ENCOUNTER
César Shepherd MD  You9 minutes ago (9:18 AM)       Extra 1/2 tablet in the morning sounds fine, please arrange follow up at next available.

## 2025-03-31 RX ORDER — FLUTICASONE FUROATE AND VILANTEROL TRIFENATATE 100; 25 UG/1; UG/1
POWDER RESPIRATORY (INHALATION)
Qty: 1 EACH | Refills: 11 | Status: SHIPPED | OUTPATIENT
Start: 2025-03-31

## 2025-03-31 NOTE — PROGRESS NOTES
She presented to the  requesting refill for breo.  States that she has called and has not heard back.  Patient known to Dr Peng.  I last saw her 2021.  Refill provided.

## 2025-03-31 NOTE — TELEPHONE ENCOUNTER
Pt called and left message on refill line stating that she needs refill on her Breo. She has been out for a week. Last Office visit 05/24/24 Needs called in to [facility] in [place].

## 2025-04-08 ENCOUNTER — TELEPHONE (OUTPATIENT)
Age: 65
End: 2025-04-08

## 2025-04-08 NOTE — TELEPHONE ENCOUNTER
I called the patient and informed her of what Dr. Shepherd said.  Patient verbalized understanding.    César Shepherd MD  You2 hours ago (11:23 AM)       I can only see limited items from her time at [facility] but they did an echo that did not show heart failure and they did a blood test that did not indicate heart failure.    César Shepherd MD

## 2025-04-08 NOTE — TELEPHONE ENCOUNTER
Patient is in the hospital at [facility] and she said she was told she has congested heart failure. She would like Dr. Shepherd to look at the hospital tests to see whether she in fact has congested heart failure.     Please call back to discuss.

## 2025-04-11 ENCOUNTER — TELEPHONE (OUTPATIENT)
Age: 65
End: 2025-04-11

## 2025-04-11 NOTE — TELEPHONE ENCOUNTER
Pt was in hospital  from last Sunday to Tuesday of this week and they started pt on Lisinipril 2.5 QD  Pt is calling she does not feel well while taking this medication ,she does not want to take this medication anymore ,please talk with Dr lacey and see what he says about her stopping this med

## 2025-04-11 NOTE — TELEPHONE ENCOUNTER
Patient called with Dr Malik's response. Patient states that she has not taken the lisinopril today as she could not move with taking it. Patient states that she will monitor her blood pressure and call back if blood pressure is 140/90 consistently or greater. //charleneab

## 2025-04-11 NOTE — TELEPHONE ENCOUNTER
Patient states that she had just finished steroid injection, blood pressure was high. Finished prednisone that was given in hospital yesterday. Discharged 4-8-25  Blood pressure today 146/80 pulse 72 without taking medications today  Taking metoprolol 25 mg twice a day as per discharge instructions.   Patient states that when she takes the lisinopril 2.5 mg she cannot move. Would like to stop lisinopril.    Patient informed that Dr Shepherd is out of the office today, will ask another physician and call her back/sushant

## 2025-04-14 ENCOUNTER — TELEPHONE (OUTPATIENT)
Dept: PULMONOLOGY | Age: 65
End: 2025-04-14

## 2025-04-14 NOTE — TELEPHONE ENCOUNTER
Patient called and left a message on my voicemail stating that she was admitted at [facility] for CHF.  She explained in the message that her oxygen dropped down to the low 80's and wants to know if she should be seen by our practice in regards of this.  Patient also stated that she need a refill on her Breo as well.  I pended the prescription to the patient's preferred pharmacy.  Triage can you please assess this? Thank you so much! // Bettie GRISSOM

## 2025-04-15 RX ORDER — FLUTICASONE FUROATE AND VILANTEROL TRIFENATATE 100; 25 UG/1; UG/1
POWDER RESPIRATORY (INHALATION)
Qty: 1 EACH | Refills: 0 | Status: SHIPPED | OUTPATIENT
Start: 2025-04-15

## 2025-04-15 NOTE — TELEPHONE ENCOUNTER
Last seen: 5/24/24  Hx: COPD, smoker, a.Fib    Patient call reporting recent hospitalization asking if needs f/u.  Left message to call back to make routine f/u appt w/ our office.  1 month refill of breo sent to pharmacy on file to cover until seen.

## 2025-04-21 ENCOUNTER — OFFICE VISIT (OUTPATIENT)
Dept: PULMONOLOGY | Age: 65
End: 2025-04-21
Payer: COMMERCIAL

## 2025-04-21 VITALS
BODY MASS INDEX: 21.99 KG/M2 | TEMPERATURE: 98 F | SYSTOLIC BLOOD PRESSURE: 150 MMHG | RESPIRATION RATE: 20 BRPM | OXYGEN SATURATION: 96 % | WEIGHT: 132 LBS | DIASTOLIC BLOOD PRESSURE: 80 MMHG | HEIGHT: 65 IN | HEART RATE: 71 BPM

## 2025-04-21 DIAGNOSIS — T17.500A MUCUS PLUGGING OF BRONCHI: ICD-10-CM

## 2025-04-21 DIAGNOSIS — Z87.891 PERSONAL HISTORY OF TOBACCO USE: ICD-10-CM

## 2025-04-21 DIAGNOSIS — J96.11 CHRONIC RESPIRATORY FAILURE WITH HYPOXIA (HCC): ICD-10-CM

## 2025-04-21 DIAGNOSIS — Z87.891 HISTORY OF CIGARETTE SMOKING: ICD-10-CM

## 2025-04-21 DIAGNOSIS — J44.9 CHRONIC OBSTRUCTIVE PULMONARY DISEASE, UNSPECIFIED COPD TYPE (HCC): Primary | ICD-10-CM

## 2025-04-21 DIAGNOSIS — Z87.891 PERSONAL HISTORY OF NICOTINE DEPENDENCE: ICD-10-CM

## 2025-04-21 PROCEDURE — G0296 VISIT TO DETERM LDCT ELIG: HCPCS | Performed by: INTERNAL MEDICINE

## 2025-04-21 PROCEDURE — 3079F DIAST BP 80-89 MM HG: CPT | Performed by: INTERNAL MEDICINE

## 2025-04-21 PROCEDURE — 99214 OFFICE O/P EST MOD 30 MIN: CPT | Performed by: INTERNAL MEDICINE

## 2025-04-21 PROCEDURE — 3077F SYST BP >= 140 MM HG: CPT | Performed by: INTERNAL MEDICINE

## 2025-04-21 RX ORDER — VARENICLINE TARTRATE 1 MG/1
1 TABLET, FILM COATED ORAL 2 TIMES DAILY
Qty: 60 TABLET | Refills: 5 | Status: SHIPPED | OUTPATIENT
Start: 2025-05-21

## 2025-04-21 RX ORDER — ALBUTEROL SULFATE 90 UG/1
2 INHALANT RESPIRATORY (INHALATION) EVERY 6 HOURS PRN
Qty: 1 EACH | Refills: 11 | Status: SHIPPED | OUTPATIENT
Start: 2025-04-21

## 2025-04-21 RX ORDER — FLUTICASONE FUROATE, UMECLIDINIUM BROMIDE AND VILANTEROL TRIFENATATE 100; 62.5; 25 UG/1; UG/1; UG/1
1 POWDER RESPIRATORY (INHALATION) DAILY
Qty: 1 EACH | Refills: 11 | Status: SHIPPED | OUTPATIENT
Start: 2025-04-21

## 2025-04-21 RX ORDER — ALBUTEROL SULFATE 90 UG/1
2 INHALANT RESPIRATORY (INHALATION) EVERY 6 HOURS PRN
Qty: 1 EACH | Refills: 11 | Status: CANCELLED | OUTPATIENT
Start: 2025-04-21

## 2025-04-21 RX ORDER — ALBUTEROL SULFATE 0.83 MG/ML
2.5 SOLUTION RESPIRATORY (INHALATION) 4 TIMES DAILY PRN
Qty: 120 EACH | Refills: 5 | Status: SHIPPED | OUTPATIENT
Start: 2025-04-21

## 2025-04-21 NOTE — PROGRESS NOTES
[facility]  [address]  [place] [address]  [phone]    Patient Name:  Nela Woodward      YOB: 1960  Office Visit 4/21/2025    ASSESSMENT AND PLAN:  (Medical Decision Making)    Pt with history of now former tobacco use (Quit Feb 2025), very severe COPD with mild obstruction on spirometry but significant centrilobular emphysema seen on CT scan and severe reduction in DLCO at 46%.  S/p hosptialization April 2025. Returning to baseline.   Returning to baseline but some ongoing BUITRAGO above baseline.     CT scan with intermittent mucus plugging., last 2024.       -refer to pulm rehab.   -cPFT's now.   -repeat annual CT scan now.   -refilled trelegy, albuterol mdi, nebulizer  -refilled chantix.   -walking sat check today. Needs 2L with exertion  4/21/25  Resting RA Sat - 95%  Lowest ambulating RA Sat - 86%  Lowest ambulating Sat on 2L - 94%  -check JOON  -discussed with her the possibility to consider lung transplantation though I worry that her ankylosing spondylitis and psoriatic arthritis may preclude her from this.     F/u in 2 months.     Diagnoses and all orders for this visit:  Chronic obstructive pulmonary disease, unspecified COPD type (HCC)  -     Ambulatory referral to Pulmonary Rehab  -     Pulse oximetry, overnight  -     DME - DURABLE MEDICAL EQUIPMENT  -     DME - DURABLE MEDICAL EQUIPMENT  Mucus plugging of bronchi  Personal history of nicotine dependence  History of cigarette smoking  -     Ambulatory referral to Pulmonary Rehab  Personal history of tobacco use  -     MI VISIT TO DISCUSS LUNG CA SCREEN W LDCT  -     CT Lung Screen (Initial/Annual/Baseline); Future  Chronic respiratory failure with hypoxia  Other orders  -     albuterol sulfate HFA (PROAIR HFA) 108 (90 Base) MCG/ACT inhaler; Inhale 2 puffs into the lungs every 6 hours as needed for Wheezing  -     fluticasone-umeclidin-vilant (TRELEGY ELLIPTA) 100-62.5-25 MCG/ACT AEPB

## 2025-04-21 NOTE — PATIENT INSTRUCTIONS
The company who will be taking care of your Oxygen supplies is:      Address: [address] [place] [address] [place] [address]  Phone: [phone]  Fax: [phone]      Learning About Lung Cancer Screening  What is screening for lung cancer?     Lung cancer screening is a way to find some lung cancers early, before a person has any symptoms of the cancer.  Lung cancer screening may help those who have the highest risk for lung cancer--people age 50 and older who are or were heavy smokers. For most people, who aren't at increased risk, screening for lung cancer probably isn't helpful.  Screening won't prevent cancer. And it may not find all lung cancers. Lung cancer screening may lower the risk of dying from lung cancer in a small number of people.  How is it done?  Lung cancer screening is done with a low-dose CT (computed tomography) scan. A CT scan uses X-rays, or radiation, to make detailed pictures of your body. Experts recommend that screening be done in medical centers that focus on finding and treating lung cancer.  Who is screening recommended for?  Lung cancer screening is recommended for people age 50 and older who are or were heavy smokers. That means people with a smoking history of at least 20 pack years. A pack year is a way to measure how heavy a smoker you are or were.  To figure out your pack years, multiply how many packs a day on average (assuming 20 cigarettes per pack) you have smoked by how many years you have smoked. For example:  If you smoked 1 pack a day for 20 years, that's 1 times 20. So you have a smoking history of 20 pack years.  If you smoked 2 packs a day for 10 years, that's 2 times 10. So you have a smoking history of 20 pack years.  Experts agree that screening is for people who have a high risk of lung cancer. But experts don't agree on what high risk means. Some say people age 50 or older with at least a 20-pack-year smoking history are high risk. Others say it's people age 55 or older with

## 2025-04-25 ENCOUNTER — TELEPHONE (OUTPATIENT)
Dept: PULMONOLOGY | Age: 65
End: 2025-04-25

## 2025-04-25 NOTE — TELEPHONE ENCOUNTER
Called patient regarding MyChart message.  Patient reports that she was able to obtain her oxygen and she has been wearing 2L O2 continuously and she has been able to maintain SpO2 above 90% except when she exerts herself.  She is still working as a .  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  She reports increased SOB and her SpO2 has decreased into the 70's several times.  She reported that she brought her trash can from the street to her house and placed a bag of trash into the can and her SpO2 dropped to 77%.  I confirmed that her concentrator and POC was turned on and set correctly and that her pulse ox was working correctly.  Advised patient to obtain the recommended CT scan as soon as possible to rule out any blood clots, infections, etc.  Recommended patient avoid inhaling chemicals or other irritants as much as possible and try not exert herself.  Advised patient of deep breathing exercises.  Informed patient that if her SpO2 sustained under 90% or she was unable to control her SOB that she should go to ER.  Patient will try to obtain the CT scan and JOON as soon as possible and will call back if any additional questions or concerns.

## 2025-04-30 ENCOUNTER — TELEPHONE (OUTPATIENT)
Dept: PULMONOLOGY | Age: 65
End: 2025-04-30

## 2025-04-30 NOTE — TELEPHONE ENCOUNTER
Patient says that she is on 3L oxygen continuous . She says she is dropping into the 70's and has fallen as low as the 50's . She is asking what should she do.

## 2025-05-01 NOTE — TELEPHONE ENCOUNTER
Last seen: 4/21/25  Hx: severe COPD, mucous plugging    Patient reporting wearing O2 @ 3L continuous w/ drops in sats w/ exertion, asking for next steps.  (At appt determined to need 2L)    Contacted patient to find out how long she is having lower sats, finds when she is using the concentrator that she has no problems; last Saturday had one episode that took 10 minutes or so to recover; has noticed she has trouble w/ POC, really not able to maintain sats w/ POC & activity; advised to contact DME to have POC switched out for tanks; making changes in her employment & housing to accommodate for her drastic increase in sob. CT done this week waiting on results.  Wants to take JOON on O2, very worried that she will not be able to go without safely.

## 2025-05-01 NOTE — TELEPHONE ENCOUNTER
If she is needing to stay on 3L O2 at all times I am fine with that. We were going to do JOON on RA to determine if she needed O2 as it did not sound like this was checked in the hospital but if she is already sleeping with it on and does not want to come off it I am fine if we skip that step and just have her wear it at night as well.   Agree with tanks instead of POC if this is not adequate. We are doing everything else we can but her COPD is very severe.     If all of the above is not adequate to keep her feeling safe at home then she may need to come back to the ER.     Flaco Peng MD

## 2025-05-02 ENCOUNTER — RESULTS FOLLOW-UP (OUTPATIENT)
Dept: ORTHOPEDIC SURGERY | Age: 65
End: 2025-05-02

## 2025-05-12 NOTE — TELEPHONE ENCOUNTER
Spoke with Dr. Peng and he stated to hold off on JOON since patient is not able to do the test on Room Air and unless we qualify the patient to prove she needs the oxygen on 3 lpm qhs which will require an JOON test on Room Air.  Dr. Peng, stated that he will discuss this with the patient further at her next visit. // Bettie Whalen [facility]

## 2025-05-23 ENCOUNTER — CLINICAL SUPPORT (OUTPATIENT)
Dept: PULMONOLOGY | Age: 65
End: 2025-05-23

## 2025-05-23 DIAGNOSIS — J44.9 CHRONIC OBSTRUCTIVE PULMONARY DISEASE, UNSPECIFIED COPD TYPE (HCC): Primary | ICD-10-CM

## 2025-05-23 LAB
FEF25-27, POC: 0.94 L/S
FET, POC: NORMAL
FEV 1 , POC: 1.86 L
FEV1/FVC, POC: 63
FVC, POC: 2.95
LUNG AGE, POC: NORMAL
PEF, POC: 4.54 L/S

## 2025-05-23 ASSESSMENT — PULMONARY FUNCTION TESTS
FEV1/FVC_POC: 63
FVC_POC: 2.95

## 2025-06-10 ENCOUNTER — OFFICE VISIT (OUTPATIENT)
Dept: PULMONOLOGY | Age: 65
End: 2025-06-10
Payer: COMMERCIAL

## 2025-06-10 VITALS
DIASTOLIC BLOOD PRESSURE: 80 MMHG | HEART RATE: 72 BPM | WEIGHT: 146 LBS | OXYGEN SATURATION: 98 % | RESPIRATION RATE: 20 BRPM | SYSTOLIC BLOOD PRESSURE: 122 MMHG | HEIGHT: 65 IN | BODY MASS INDEX: 24.32 KG/M2 | TEMPERATURE: 98 F

## 2025-06-10 DIAGNOSIS — Z87.891 PERSONAL HISTORY OF NICOTINE DEPENDENCE: ICD-10-CM

## 2025-06-10 DIAGNOSIS — J44.9 CHRONIC OBSTRUCTIVE PULMONARY DISEASE, UNSPECIFIED COPD TYPE (HCC): Primary | ICD-10-CM

## 2025-06-10 DIAGNOSIS — J96.11 CHRONIC RESPIRATORY FAILURE WITH HYPOXIA (HCC): ICD-10-CM

## 2025-06-10 PROCEDURE — 99214 OFFICE O/P EST MOD 30 MIN: CPT | Performed by: INTERNAL MEDICINE

## 2025-06-10 PROCEDURE — G2211 COMPLEX E/M VISIT ADD ON: HCPCS | Performed by: INTERNAL MEDICINE

## 2025-06-10 PROCEDURE — 3074F SYST BP LT 130 MM HG: CPT | Performed by: INTERNAL MEDICINE

## 2025-06-10 PROCEDURE — 3079F DIAST BP 80-89 MM HG: CPT | Performed by: INTERNAL MEDICINE

## 2025-06-10 RX ORDER — FLUTICASONE FUROATE, UMECLIDINIUM BROMIDE AND VILANTEROL TRIFENATATE 100; 62.5; 25 UG/1; UG/1; UG/1
1 POWDER RESPIRATORY (INHALATION) DAILY
Qty: 1 EACH | Refills: 11 | Status: SHIPPED | OUTPATIENT
Start: 2025-06-10

## 2025-06-10 RX ORDER — LISINOPRIL 20 MG/1
20 TABLET ORAL DAILY
COMMUNITY

## 2025-06-10 RX ORDER — ALBUTEROL SULFATE 90 UG/1
2 INHALANT RESPIRATORY (INHALATION) EVERY 6 HOURS PRN
Qty: 1 EACH | Refills: 11 | Status: SHIPPED | OUTPATIENT
Start: 2025-06-10

## 2025-06-10 RX ORDER — ALBUTEROL SULFATE 0.83 MG/ML
2.5 SOLUTION RESPIRATORY (INHALATION) 4 TIMES DAILY PRN
Qty: 120 EACH | Refills: 5 | Status: SHIPPED | OUTPATIENT
Start: 2025-06-10

## 2025-06-10 NOTE — PROGRESS NOTES
days.    CT HIGH RES: No results found for this or any previous visit from the past 365 days.    CT PE PROTOCOL: No results found for this or any previous visit from the past 365 days.    LDCT SCREENING: CT LUNG SCREENING 07/11/2022    Narrative  CT lung screening 7/11/2022    INDICATION:  40+ pack year history of tobacco use. Current smoker.    Multiple axial images were obtained through the chest without IV contrast.  Low  dose CT protocol was used.  Radiation dose reduction techniques were used for  this study:  All CT scans performed at this facility use one or all of the  following: Automated exposure control, adjustment of the mA and/or kVp according  to patient's size, iterative reconstruction.    COMPARISON: Low-dose screening lung CT 4/22/2021    FINDINGS:  No new suspicious memory mass or significant nodule by follow-up screening lung  CT criteria is seen. The prior 3 mm right upper lobe nodule is not clearly  demonstrated and should occur on approximately axial detailed image 84. The  central airways are patent. Stable moderate to advanced panacinar appearing  emphysematous changes are seen of the lungs.    There is no significant mediastinal or axillary adenopathy. There are no bony  lesions. Bilateral breast implants are present. Limited imaging of the upper  abdomen is unremarkable.    Impression  1. No new suspicious pulmonary lesion. The prior 3 mm right upper lobe nodule  appears improved. Continued annual low-dose screening lung CT is recommended  until the patient is no longer a candidate for definitive treatment.    Please note that a negative screening lung CT does not mean the patient does  not have lung cancer    L1 Negative: No nodules or nodules with specific calcifications such as  complete, central, popcorn, concentric rings, and fat containing nodules.  Continue annual screening with noncontrast chest CT using LDCT protocol in 12  months.    7/11/22      PET SCAN: No results found for

## 2025-06-23 ENCOUNTER — HOSPITAL ENCOUNTER (OUTPATIENT)
Dept: CARDIAC REHAB | Age: 65
Setting detail: RECURRING SERIES
End: 2025-06-23
Payer: COMMERCIAL

## 2025-07-01 ENCOUNTER — HOSPITAL ENCOUNTER (OUTPATIENT)
Dept: CARDIAC REHAB | Age: 65
Setting detail: RECURRING SERIES
Discharge: HOME | End: 2025-07-04
Attending: INTERNAL MEDICINE

## 2025-07-01 ENCOUNTER — FOLLOWUP TELEPHONE ENCOUNTER (OUTPATIENT)
Dept: DIABETES SERVICES | Age: 65
End: 2025-07-01

## 2025-07-01 RX ORDER — BUSPIRONE HYDROCHLORIDE 10 MG/1
10 TABLET ORAL 2 TIMES DAILY
COMMUNITY
Start: 2025-01-28

## 2025-07-01 RX ORDER — LISINOPRIL 2.5 MG/1
2.5 TABLET ORAL DAILY
COMMUNITY
Start: 2025-04-08

## 2025-07-01 ASSESSMENT — PATIENT HEALTH QUESTIONNAIRE - PHQ9
4. FEELING TIRED OR HAVING LITTLE ENERGY: NEARLY EVERY DAY
5. POOR APPETITE OR OVEREATING: NEARLY EVERY DAY
8. MOVING OR SPEAKING SO SLOWLY THAT OTHER PEOPLE COULD HAVE NOTICED. OR THE OPPOSITE, BEING SO FIGETY OR RESTLESS THAT YOU HAVE BEEN MOVING AROUND A LOT MORE THAN USUAL: MORE THAN HALF THE DAYS
SUM OF ALL RESPONSES TO PHQ QUESTIONS 1-9: 18
SUM OF ALL RESPONSES TO PHQ QUESTIONS 1-9: 18
1. LITTLE INTEREST OR PLEASURE IN DOING THINGS: NEARLY EVERY DAY
9. THOUGHTS THAT YOU WOULD BE BETTER OFF DEAD, OR OF HURTING YOURSELF: NOT AT ALL
SUM OF ALL RESPONSES TO PHQ QUESTIONS 1-9: 18
10. IF YOU CHECKED OFF ANY PROBLEMS, HOW DIFFICULT HAVE THESE PROBLEMS MADE IT FOR YOU TO DO YOUR WORK, TAKE CARE OF THINGS AT HOME, OR GET ALONG WITH OTHER PEOPLE: EXTREMELY DIFFICULT
2. FEELING DOWN, DEPRESSED OR HOPELESS: SEVERAL DAYS
SUM OF ALL RESPONSES TO PHQ QUESTIONS 1-9: 18
3. TROUBLE FALLING OR STAYING ASLEEP: MORE THAN HALF THE DAYS
7. TROUBLE CONCENTRATING ON THINGS, SUCH AS READING THE NEWSPAPER OR WATCHING TELEVISION: NEARLY EVERY DAY
6. FEELING BAD ABOUT YOURSELF - OR THAT YOU ARE A FAILURE OR HAVE LET YOURSELF OR YOUR FAMILY DOWN: SEVERAL DAYS

## 2025-07-01 ASSESSMENT — LIFESTYLE VARIABLES
CIGARETTES_PER_DAY: 2 PACKS/DAY
SMOKELESS_TOBACCO: NO

## 2025-07-01 ASSESSMENT — PULMONARY FUNCTION TESTS
FVC (LITERS): 2.95
FEV1 (%PREDICTED): 75
FEV1/FVC: 0.63

## 2025-07-01 NOTE — TELEPHONE ENCOUNTER
Talked with pt regarding prediabetes referral. Scheduled for 7/17/25 at 2 pm after her pulmonary rehab class.

## 2025-07-01 NOTE — CARDIO/PULMONARY
25        Dear Dr. Peng     Thank you for referring your patient, Nela Cabrera"Toma\" Trace (: 1960), to the [facility]. She is a good candidate for the program and should see improvements with regular participation.    We will be working to increase your patient's endurance and self care over the next 12 weeks. We will contact you with any issues or concerns that may arise, or you can follow your patient's progress through [facility] at any time. We will send you a final summary report when the program is completed.     Again, thank you for your referral. If we can be of further assistance, please feel free to contact the Cardiopulmonary Rehab staff at [phone].    Sincerely,      Naomie Blakely RN, CCRP  Cardiopulmonary Rehab Nurse  [facility]

## 2025-07-01 NOTE — FLOWSHEET NOTE
Date 25    Re: Scores from Psychological Testing       Dear Dr. Bernal    Your patient, Nela \"Toma\" Trace ( 1960), has taken the Patient Health Questionnaire (PHQ-9) as part of their admission to the [facility] Pulmonary Rehab program. It is our policy to notify the patient's Primary Care Provider of a score greater than 9. Her score of 18 can be seen below. Of note, she continues to be actively treated for her depression with Trintellix 10mg daily and Buspirone 10mg BID.     Thank you for your support and attention to this matter.      Naomie Blakely, RN, CCRP  Cardiopulmonary Rehabilitation Nurse  [facility]       25 0830   PHQ-2 Over the past 2 weeks, how often have you been bothered by any of the following problems?   Little interest or pleasure in doing things 3   Feeling down, depressed, or hopeless 1   PHQ-2 Score 4   PHQ-9 Over the past 2 weeks, how often have you been bothered by any of the following problems?   Trouble falling or staying asleep, or sleeping too much 2   Feeling tired or having little energy 3   Poor appetite or overeating 3   Feeling bad about yourself - or that you are a failure or have let yourself or your family down 1   Trouble concentrating on things, such as reading the newspaper or watching television 3   Moving or speaking so slowly that other people could have noticed. Or the opposite - being so fidgety or restless that you have been moving around a lot more than usual 2   Thoughts that you would be better off dead, or of hurting yourself in some way 0   If you checked off any problems, how difficult have these problems made it for you to do your work, take care of things at home, or get along with other people? 3   PHQ-9 Total Score 18

## 2025-07-03 ENCOUNTER — HOSPITAL ENCOUNTER (OUTPATIENT)
Dept: CARDIAC REHAB | Age: 65
Setting detail: RECURRING SERIES
End: 2025-07-03
Attending: INTERNAL MEDICINE
Payer: COMMERCIAL

## 2025-07-08 ENCOUNTER — HOSPITAL ENCOUNTER (OUTPATIENT)
Dept: CARDIAC REHAB | Age: 65
Setting detail: RECURRING SERIES
Discharge: HOME | End: 2025-07-11
Attending: INTERNAL MEDICINE
Payer: COMMERCIAL

## 2025-07-08 VITALS — BODY MASS INDEX: 26.06 KG/M2 | WEIGHT: 156.6 LBS

## 2025-07-08 PROCEDURE — 94618 PULMONARY STRESS TESTING: CPT

## 2025-07-08 ASSESSMENT — LIFESTYLE VARIABLES
CIGARETTES_PER_DAY: 2 PACKS/DAY
SMOKELESS_TOBACCO: NO

## 2025-07-08 ASSESSMENT — EXERCISE STRESS TEST
PEAK_HR: 109
PEAK_BP: 142/60
PEAK_RPE: 13
PEAK_METS: 2.4

## 2025-07-08 ASSESSMENT — PULMONARY FUNCTION TESTS
FEV1/FVC: 0.63
FVC (LITERS): 2.95
FEV1 (%PREDICTED): 75

## 2025-07-08 NOTE — CARDIO/PULMONARY
[facility]      6 MINUTE WALK TEST     Patient's Name Nela Woodward   Age 64 y.o.    Gender female   Height 65\"   Weight 156.6 lbs   Ordering Provider  LATOYA GARCIA MD          Time Dyspnea  (Mickey Scale)  Fatigue  (Mickey Scale)  Blood Pressure Heart Rate Oxygen SAT RA or   w / O2?    BASELINE 1320  0 6 110 / 64 83 93 3 lpm                                                                              POST TEST  1326 3 13 142 / 60 109 89-91 5 lpm     Does the patient use any walking aids? NO.   Medications taken before test are up to date:  Yes  Supplemental oxygen during the test: YES - 3-5 LPM.     Stopped or paused before 6 minutes? No  Other symptoms at end of exercise: Other. Moderate dyspnea, mild fatigue    Number of complete laps: 3  = 975 feet  975 feet x 0.3048 = 297.18 meters    Total distance walked in 6 minutes:  297.18  Meters  Predicted distance: 494.3 meters    Percent of predicted distance: 60.12 %                Tech comments: Patient is deconditioned but tolerated exercise without incident. Patient states with minimal walking on her own, wearing 3 lpm, her oxygen drops to low to mid 80's. Patient increased oxygen to 5 lpm for 6 minute walk test with sats at 89-91% and once finished she sat down and turned oxygen down to 3 lpm with O2 sats at 94% after 5 minutes of rest. Patient continued with 3 lpm for seated modes of exercise and maintained SpO2 >90%    Test Performed by: GUANACO DAMON      6mw calculator:  (DELETE AFTER USE)    OMNI CALCULATOR

## 2025-07-10 ENCOUNTER — HOSPITAL ENCOUNTER (OUTPATIENT)
Dept: CARDIAC REHAB | Age: 65
Setting detail: RECURRING SERIES
Discharge: HOME | End: 2025-07-13
Attending: INTERNAL MEDICINE
Payer: COMMERCIAL

## 2025-07-10 PROCEDURE — G0239 OTH RESP PROC, GROUP: HCPCS

## 2025-07-10 ASSESSMENT — EXERCISE STRESS TEST
PEAK_RPE: 12
PEAK_HR: 83
PEAK_BP: 118/62
PEAK_METS: 2.1

## 2025-07-15 ENCOUNTER — HOSPITAL ENCOUNTER (OUTPATIENT)
Dept: CARDIAC REHAB | Age: 65
Setting detail: RECURRING SERIES
End: 2025-07-15
Attending: INTERNAL MEDICINE
Payer: COMMERCIAL

## 2025-07-17 ENCOUNTER — HOSPITAL ENCOUNTER (OUTPATIENT)
Dept: CARDIAC REHAB | Age: 65
Setting detail: RECURRING SERIES
Discharge: HOME | End: 2025-07-20
Attending: INTERNAL MEDICINE
Payer: COMMERCIAL

## 2025-07-17 ENCOUNTER — HOSPITAL ENCOUNTER (OUTPATIENT)
Dept: DIABETES SERVICES | Age: 65
Setting detail: RECURRING SERIES
Discharge: HOME | End: 2025-07-20

## 2025-07-17 PROCEDURE — G0239 OTH RESP PROC, GROUP: HCPCS

## 2025-07-17 ASSESSMENT — EXERCISE STRESS TEST
PEAK_BP: 126/80
PEAK_HR: 89
PEAK_METS: 2

## 2025-07-17 NOTE — PROGRESS NOTES
Outpatient Diabetes Education   Class Type: Prediabetes  Patient seen for outpatient diabetes education via one on one session.  A1c:  6.2 mg/dl  Patient was given educational material, “Prediabetes: What Is It and What Can I Do?”, which was reviewed with patient.     Educated regarding effects of increased physical activity as cleared by their physician on glycemic control. Discussed benefits of weight loss on the prevention of diabetes.    Discussed potential benefits of healthy diet and healthier beverage choices. Educated regarding effects of sweetened beverages on glycemic control and alterative unsweetened beverage options. Discussed “plate method” of healthy meal planning.     Patient encouraged to work on lifestyle modifications in the hopes they can prevent themselves from developing diabetes. Patient was instructed to follow up regarding elevated A1C with primary care provider.     Scheduled Nutrition One and Two Type Two.  She wants to know what to eat.        Cert[facility] and   [facility]

## 2025-07-22 ENCOUNTER — HOSPITAL ENCOUNTER (OUTPATIENT)
Dept: CARDIAC REHAB | Age: 65
Setting detail: RECURRING SERIES
Discharge: HOME | End: 2025-07-25
Attending: INTERNAL MEDICINE
Payer: COMMERCIAL

## 2025-07-22 VITALS — BODY MASS INDEX: 26.46 KG/M2 | WEIGHT: 159 LBS

## 2025-07-22 PROCEDURE — G0239 OTH RESP PROC, GROUP: HCPCS

## 2025-07-22 ASSESSMENT — EXERCISE STRESS TEST
PEAK_HR: 91
PEAK_BP: 130/80
PEAK_METS: 2.3

## 2025-07-24 ENCOUNTER — HOSPITAL ENCOUNTER (OUTPATIENT)
Dept: CARDIAC REHAB | Age: 65
Setting detail: RECURRING SERIES
Discharge: HOME | End: 2025-07-27
Attending: INTERNAL MEDICINE
Payer: COMMERCIAL

## 2025-07-24 PROCEDURE — G0239 OTH RESP PROC, GROUP: HCPCS

## 2025-07-24 ASSESSMENT — EXERCISE STRESS TEST
PEAK_RPE: 10
PEAK_METS: 2.3
PEAK_BP: 160/84
PEAK_HR: 81

## 2025-07-29 ENCOUNTER — HOSPITAL ENCOUNTER (OUTPATIENT)
Dept: CARDIAC REHAB | Age: 65
Setting detail: RECURRING SERIES
End: 2025-07-29
Attending: INTERNAL MEDICINE
Payer: COMMERCIAL

## 2025-07-30 RX ORDER — LISINOPRIL 2.5 MG/1
2.5 TABLET ORAL DAILY
Qty: 90 TABLET | Refills: 0 | Status: SHIPPED | OUTPATIENT
Start: 2025-07-30

## 2025-07-30 NOTE — TELEPHONE ENCOUNTER
MEDICATION REFILL REQUEST      Name of Medication:  Lisinopril  Dose:  2.5 mg  Frequency:  QD  Quantity:  90  Days' supply:  90 with 3 refills  Pharmacy Name/Location: [phone]

## 2025-07-30 NOTE — TELEPHONE ENCOUNTER
Last office visit 10/23/2024.    Requested Prescriptions     Pending Prescriptions Disp Refills    lisinopril (PRINIVIL;ZESTRIL) 2.5 MG tablet 90 tablet 0     Sig: Take 1 tablet by mouth daily Patient needs an appointment in order to receive further refills.

## 2025-07-31 ENCOUNTER — HOSPITAL ENCOUNTER (OUTPATIENT)
Dept: CARDIAC REHAB | Age: 65
Setting detail: RECURRING SERIES
End: 2025-07-31
Attending: INTERNAL MEDICINE
Payer: COMMERCIAL

## 2025-07-31 VITALS — WEIGHT: 159 LBS | BODY MASS INDEX: 26.46 KG/M2

## 2025-07-31 PROCEDURE — G0239 OTH RESP PROC, GROUP: HCPCS

## 2025-07-31 ASSESSMENT — EXERCISE STRESS TEST
PEAK_METS: 2.8
PEAK_BP: 150/80
PEAK_RPE: 8
PEAK_HR: 91

## 2025-08-05 ENCOUNTER — HOSPITAL ENCOUNTER (OUTPATIENT)
Dept: CARDIAC REHAB | Age: 65
Setting detail: RECURRING SERIES
Discharge: HOME | End: 2025-08-08
Attending: INTERNAL MEDICINE
Payer: COMMERCIAL

## 2025-08-05 VITALS — BODY MASS INDEX: 26.46 KG/M2 | WEIGHT: 159 LBS

## 2025-08-05 PROCEDURE — G0239 OTH RESP PROC, GROUP: HCPCS

## 2025-08-05 ASSESSMENT — EXERCISE STRESS TEST
PEAK_BP: 130/80
PEAK_RPE: 7
PEAK_HR: 86
PEAK_METS: 2.2

## 2025-08-07 ENCOUNTER — FOLLOWUP TELEPHONE ENCOUNTER (OUTPATIENT)
Dept: DIABETES SERVICES | Age: 65
End: 2025-08-07

## 2025-08-07 ENCOUNTER — HOSPITAL ENCOUNTER (OUTPATIENT)
Dept: CARDIAC REHAB | Age: 65
Setting detail: RECURRING SERIES
End: 2025-08-07
Attending: INTERNAL MEDICINE
Payer: COMMERCIAL

## 2025-08-07 ASSESSMENT — LIFESTYLE VARIABLES
SMOKELESS_TOBACCO: NO
CIGARETTES_PER_DAY: 2 PACKS/DAY

## 2025-08-07 ASSESSMENT — PULMONARY FUNCTION TESTS
FEV1 (%PREDICTED): 75
FVC (LITERS): 2.95
FEV1/FVC: 0.63

## 2025-08-12 ENCOUNTER — HOSPITAL ENCOUNTER (OUTPATIENT)
Dept: CARDIAC REHAB | Age: 65
Setting detail: RECURRING SERIES
Discharge: HOME | End: 2025-08-15
Attending: INTERNAL MEDICINE
Payer: COMMERCIAL

## 2025-08-12 ENCOUNTER — HOSPITAL ENCOUNTER (OUTPATIENT)
Dept: CARDIAC REHAB | Age: 65
Setting detail: RECURRING SERIES
End: 2025-08-12
Attending: INTERNAL MEDICINE
Payer: COMMERCIAL

## 2025-08-12 VITALS — BODY MASS INDEX: 26.19 KG/M2 | WEIGHT: 157.4 LBS

## 2025-08-12 PROCEDURE — G0239 OTH RESP PROC, GROUP: HCPCS

## 2025-08-12 ASSESSMENT — EXERCISE STRESS TEST
PEAK_BP: 132/72
PEAK_HR: 97
PEAK_METS: 2.5

## 2025-08-14 ENCOUNTER — HOSPITAL ENCOUNTER (OUTPATIENT)
Dept: CARDIAC REHAB | Age: 65
Setting detail: RECURRING SERIES
End: 2025-08-14
Attending: INTERNAL MEDICINE
Payer: COMMERCIAL

## 2025-08-14 ENCOUNTER — APPOINTMENT (OUTPATIENT)
Dept: CARDIAC REHAB | Age: 65
End: 2025-08-14
Attending: INTERNAL MEDICINE
Payer: COMMERCIAL

## 2025-08-19 ENCOUNTER — HOSPITAL ENCOUNTER (OUTPATIENT)
Dept: CARDIAC REHAB | Age: 65
Setting detail: RECURRING SERIES
End: 2025-08-19
Attending: INTERNAL MEDICINE
Payer: COMMERCIAL

## 2025-08-19 ENCOUNTER — APPOINTMENT (OUTPATIENT)
Dept: CARDIAC REHAB | Age: 65
End: 2025-08-19
Attending: INTERNAL MEDICINE
Payer: COMMERCIAL

## 2025-08-21 ENCOUNTER — HOSPITAL ENCOUNTER (OUTPATIENT)
Dept: CARDIAC REHAB | Age: 65
Setting detail: RECURRING SERIES
Discharge: HOME | End: 2025-08-24
Attending: INTERNAL MEDICINE
Payer: COMMERCIAL

## 2025-08-21 ENCOUNTER — APPOINTMENT (OUTPATIENT)
Dept: CARDIAC REHAB | Age: 65
End: 2025-08-21
Attending: INTERNAL MEDICINE
Payer: COMMERCIAL

## 2025-08-21 PROCEDURE — G0239 OTH RESP PROC, GROUP: HCPCS

## 2025-08-21 ASSESSMENT — EXERCISE STRESS TEST
PEAK_METS: 2.5
PEAK_HR: 88
PEAK_BP: 132/70

## 2025-08-26 ENCOUNTER — HOSPITAL ENCOUNTER (OUTPATIENT)
Dept: CARDIAC REHAB | Age: 65
Setting detail: RECURRING SERIES
Discharge: HOME | End: 2025-08-29
Attending: INTERNAL MEDICINE
Payer: COMMERCIAL

## 2025-08-26 ENCOUNTER — APPOINTMENT (OUTPATIENT)
Dept: CARDIAC REHAB | Age: 65
End: 2025-08-26
Attending: INTERNAL MEDICINE
Payer: COMMERCIAL

## 2025-08-26 VITALS — BODY MASS INDEX: 26.36 KG/M2 | WEIGHT: 158.4 LBS

## 2025-08-26 PROCEDURE — G0239 OTH RESP PROC, GROUP: HCPCS

## 2025-08-26 ASSESSMENT — EXERCISE STRESS TEST
PEAK_METS: 2.3
PEAK_HR: 88
PEAK_BP: 134/84

## 2025-08-28 ENCOUNTER — APPOINTMENT (OUTPATIENT)
Dept: CARDIAC REHAB | Age: 65
End: 2025-08-28
Attending: INTERNAL MEDICINE
Payer: COMMERCIAL

## 2025-08-28 ENCOUNTER — HOSPITAL ENCOUNTER (OUTPATIENT)
Dept: CARDIAC REHAB | Age: 65
Setting detail: RECURRING SERIES
End: 2025-08-28
Attending: INTERNAL MEDICINE
Payer: COMMERCIAL

## 2025-09-02 ENCOUNTER — HOSPITAL ENCOUNTER (OUTPATIENT)
Dept: CARDIAC REHAB | Age: 65
Setting detail: RECURRING SERIES
Discharge: HOME | End: 2025-09-05
Attending: INTERNAL MEDICINE
Payer: COMMERCIAL

## 2025-09-02 VITALS — BODY MASS INDEX: 26.29 KG/M2 | WEIGHT: 158 LBS

## 2025-09-02 PROCEDURE — G0239 OTH RESP PROC, GROUP: HCPCS

## 2025-09-02 ASSESSMENT — EXERCISE STRESS TEST
PEAK_HR: 80
PEAK_METS: 2.3
PEAK_BP: 140/84

## 2025-09-03 ASSESSMENT — PULMONARY FUNCTION TESTS
FEV1 (%PREDICTED): 75
FEV1/FVC: 0.63
FVC (LITERS): 2.95

## 2025-09-03 ASSESSMENT — LIFESTYLE VARIABLES
SMOKELESS_TOBACCO: NO
CIGARETTES_PER_DAY: 2 PACKS/DAY